# Patient Record
Sex: FEMALE | Race: WHITE | HISPANIC OR LATINO | URBAN - METROPOLITAN AREA
[De-identification: names, ages, dates, MRNs, and addresses within clinical notes are randomized per-mention and may not be internally consistent; named-entity substitution may affect disease eponyms.]

---

## 2018-04-22 ENCOUNTER — INPATIENT (INPATIENT)
Facility: HOSPITAL | Age: 73
LOS: 7 days | Discharge: HOME | DRG: 342 | End: 2018-04-30
Attending: SURGERY | Admitting: SURGERY
Payer: MEDICAID

## 2018-04-22 VITALS — WEIGHT: 134.92 LBS | HEIGHT: 58 IN

## 2018-04-22 DIAGNOSIS — K35.80 UNSPECIFIED ACUTE APPENDICITIS: ICD-10-CM

## 2018-04-22 LAB
ALBUMIN SERPL ELPH-MCNC: 4.5 G/DL — SIGNIFICANT CHANGE UP (ref 3.3–5.2)
ALP SERPL-CCNC: 85 U/L — SIGNIFICANT CHANGE UP (ref 40–120)
ALT FLD-CCNC: 27 U/L — SIGNIFICANT CHANGE UP
ANION GAP SERPL CALC-SCNC: 18 MMOL/L — HIGH (ref 5–17)
APTT BLD: 28.1 SEC — SIGNIFICANT CHANGE UP (ref 27.5–37.4)
AST SERPL-CCNC: 27 U/L — SIGNIFICANT CHANGE UP
BASOPHILS # BLD AUTO: 0 K/UL — SIGNIFICANT CHANGE UP (ref 0–0.2)
BASOPHILS NFR BLD AUTO: 0.1 % — SIGNIFICANT CHANGE UP (ref 0–2)
BILIRUB SERPL-MCNC: 1.1 MG/DL — SIGNIFICANT CHANGE UP (ref 0.4–2)
BLD GP AB SCN SERPL QL: SIGNIFICANT CHANGE UP
BUN SERPL-MCNC: 30 MG/DL — HIGH (ref 8–20)
CALCIUM SERPL-MCNC: 9.6 MG/DL — SIGNIFICANT CHANGE UP (ref 8.6–10.2)
CHLORIDE SERPL-SCNC: 92 MMOL/L — LOW (ref 98–107)
CO2 SERPL-SCNC: 23 MMOL/L — SIGNIFICANT CHANGE UP (ref 22–29)
CREAT SERPL-MCNC: 1.06 MG/DL — SIGNIFICANT CHANGE UP (ref 0.5–1.3)
EOSINOPHIL # BLD AUTO: 0 K/UL — SIGNIFICANT CHANGE UP (ref 0–0.5)
EOSINOPHIL NFR BLD AUTO: 0 % — SIGNIFICANT CHANGE UP (ref 0–6)
GLUCOSE SERPL-MCNC: 115 MG/DL — SIGNIFICANT CHANGE UP (ref 70–115)
HCT VFR BLD CALC: 38.6 % — SIGNIFICANT CHANGE UP (ref 37–47)
HGB BLD-MCNC: 12.9 G/DL — SIGNIFICANT CHANGE UP (ref 12–16)
INR BLD: 1.15 RATIO — SIGNIFICANT CHANGE UP (ref 0.88–1.16)
LACTATE BLDV-MCNC: 4.4 MMOL/L — CRITICAL HIGH (ref 0.5–2)
LIDOCAIN IGE QN: 9 U/L — LOW (ref 22–51)
LYMPHOCYTES # BLD AUTO: 1.1 K/UL — SIGNIFICANT CHANGE UP (ref 1–4.8)
LYMPHOCYTES # BLD AUTO: 7.6 % — LOW (ref 20–55)
MCHC RBC-ENTMCNC: 29.3 PG — SIGNIFICANT CHANGE UP (ref 27–31)
MCHC RBC-ENTMCNC: 33.4 G/DL — SIGNIFICANT CHANGE UP (ref 32–36)
MCV RBC AUTO: 87.5 FL — SIGNIFICANT CHANGE UP (ref 81–99)
MONOCYTES # BLD AUTO: 0.5 K/UL — SIGNIFICANT CHANGE UP (ref 0–0.8)
MONOCYTES NFR BLD AUTO: 3.7 % — SIGNIFICANT CHANGE UP (ref 3–10)
NEUTROPHILS # BLD AUTO: 12.3 K/UL — HIGH (ref 1.8–8)
NEUTROPHILS NFR BLD AUTO: 88.1 % — HIGH (ref 37–73)
PLATELET # BLD AUTO: 233 K/UL — SIGNIFICANT CHANGE UP (ref 150–400)
POTASSIUM SERPL-MCNC: 3.7 MMOL/L — SIGNIFICANT CHANGE UP (ref 3.5–5.3)
POTASSIUM SERPL-SCNC: 3.7 MMOL/L — SIGNIFICANT CHANGE UP (ref 3.5–5.3)
PROT SERPL-MCNC: 8.7 G/DL — SIGNIFICANT CHANGE UP (ref 6.6–8.7)
PROTHROM AB SERPL-ACNC: 12.7 SEC — SIGNIFICANT CHANGE UP (ref 9.8–12.7)
RBC # BLD: 4.41 M/UL — SIGNIFICANT CHANGE UP (ref 4.4–5.2)
RBC # FLD: 13 % — SIGNIFICANT CHANGE UP (ref 11–15.6)
SODIUM SERPL-SCNC: 133 MMOL/L — LOW (ref 135–145)
TYPE + AB SCN PNL BLD: SIGNIFICANT CHANGE UP
WBC # BLD: 14 K/UL — HIGH (ref 4.8–10.8)
WBC # FLD AUTO: 14 K/UL — HIGH (ref 4.8–10.8)

## 2018-04-22 PROCEDURE — 99285 EMERGENCY DEPT VISIT HI MDM: CPT

## 2018-04-22 PROCEDURE — 74177 CT ABD & PELVIS W/CONTRAST: CPT | Mod: 26

## 2018-04-22 PROCEDURE — 88304 TISSUE EXAM BY PATHOLOGIST: CPT | Mod: 26

## 2018-04-22 PROCEDURE — 93010 ELECTROCARDIOGRAM REPORT: CPT

## 2018-04-22 PROCEDURE — 88305 TISSUE EXAM BY PATHOLOGIST: CPT | Mod: 26

## 2018-04-22 PROCEDURE — 71045 X-RAY EXAM CHEST 1 VIEW: CPT | Mod: 26

## 2018-04-22 RX ORDER — PIPERACILLIN AND TAZOBACTAM 4; .5 G/20ML; G/20ML
3.38 INJECTION, POWDER, LYOPHILIZED, FOR SOLUTION INTRAVENOUS ONCE
Qty: 0 | Refills: 0 | Status: COMPLETED | OUTPATIENT
Start: 2018-04-22 | End: 2018-04-22

## 2018-04-22 RX ORDER — ACETAMINOPHEN 500 MG
650 TABLET ORAL EVERY 6 HOURS
Qty: 0 | Refills: 0 | Status: DISCONTINUED | OUTPATIENT
Start: 2018-04-22 | End: 2018-04-30

## 2018-04-22 RX ORDER — HYDROMORPHONE HYDROCHLORIDE 2 MG/ML
0.5 INJECTION INTRAMUSCULAR; INTRAVENOUS; SUBCUTANEOUS
Qty: 0 | Refills: 0 | Status: DISCONTINUED | OUTPATIENT
Start: 2018-04-22 | End: 2018-04-22

## 2018-04-22 RX ORDER — SODIUM CHLORIDE 9 MG/ML
2000 INJECTION INTRAMUSCULAR; INTRAVENOUS; SUBCUTANEOUS ONCE
Qty: 0 | Refills: 0 | Status: COMPLETED | OUTPATIENT
Start: 2018-04-22 | End: 2018-04-22

## 2018-04-22 RX ORDER — SODIUM CHLORIDE 9 MG/ML
1000 INJECTION, SOLUTION INTRAVENOUS
Qty: 0 | Refills: 0 | Status: DISCONTINUED | OUTPATIENT
Start: 2018-04-22 | End: 2018-04-22

## 2018-04-22 RX ORDER — GABAPENTIN 400 MG/1
300 CAPSULE ORAL THREE TIMES A DAY
Qty: 0 | Refills: 0 | Status: DISCONTINUED | OUTPATIENT
Start: 2018-04-22 | End: 2018-04-30

## 2018-04-22 RX ORDER — ENOXAPARIN SODIUM 100 MG/ML
40 INJECTION SUBCUTANEOUS DAILY
Qty: 0 | Refills: 0 | Status: DISCONTINUED | OUTPATIENT
Start: 2018-04-22 | End: 2018-04-30

## 2018-04-22 RX ORDER — FENTANYL CITRATE 50 UG/ML
50 INJECTION INTRAVENOUS
Qty: 0 | Refills: 0 | Status: DISCONTINUED | OUTPATIENT
Start: 2018-04-22 | End: 2018-04-22

## 2018-04-22 RX ORDER — SENNA PLUS 8.6 MG/1
2 TABLET ORAL AT BEDTIME
Qty: 0 | Refills: 0 | Status: DISCONTINUED | OUTPATIENT
Start: 2018-04-22 | End: 2018-04-30

## 2018-04-22 RX ORDER — PIPERACILLIN AND TAZOBACTAM 4; .5 G/20ML; G/20ML
3.38 INJECTION, POWDER, LYOPHILIZED, FOR SOLUTION INTRAVENOUS EVERY 8 HOURS
Qty: 0 | Refills: 0 | Status: DISCONTINUED | OUTPATIENT
Start: 2018-04-22 | End: 2018-04-28

## 2018-04-22 RX ORDER — ONDANSETRON 8 MG/1
4 TABLET, FILM COATED ORAL EVERY 6 HOURS
Qty: 0 | Refills: 0 | Status: DISCONTINUED | OUTPATIENT
Start: 2018-04-22 | End: 2018-04-30

## 2018-04-22 RX ORDER — ACETAMINOPHEN 500 MG
650 TABLET ORAL ONCE
Qty: 0 | Refills: 0 | Status: COMPLETED | OUTPATIENT
Start: 2018-04-22 | End: 2018-04-22

## 2018-04-22 RX ORDER — TRAMADOL HYDROCHLORIDE 50 MG/1
25 TABLET ORAL EVERY 4 HOURS
Qty: 0 | Refills: 0 | Status: DISCONTINUED | OUTPATIENT
Start: 2018-04-22 | End: 2018-04-27

## 2018-04-22 RX ORDER — DOCUSATE SODIUM 100 MG
100 CAPSULE ORAL THREE TIMES A DAY
Qty: 0 | Refills: 0 | Status: DISCONTINUED | OUTPATIENT
Start: 2018-04-22 | End: 2018-04-30

## 2018-04-22 RX ORDER — ONDANSETRON 8 MG/1
4 TABLET, FILM COATED ORAL ONCE
Qty: 0 | Refills: 0 | Status: DISCONTINUED | OUTPATIENT
Start: 2018-04-22 | End: 2018-04-22

## 2018-04-22 RX ORDER — HYDROMORPHONE HYDROCHLORIDE 2 MG/ML
0.5 INJECTION INTRAMUSCULAR; INTRAVENOUS; SUBCUTANEOUS ONCE
Qty: 0 | Refills: 0 | Status: DISCONTINUED | OUTPATIENT
Start: 2018-04-22 | End: 2018-04-22

## 2018-04-22 RX ORDER — IBUPROFEN 200 MG
400 TABLET ORAL EVERY 6 HOURS
Qty: 0 | Refills: 0 | Status: DISCONTINUED | OUTPATIENT
Start: 2018-04-22 | End: 2018-04-30

## 2018-04-22 RX ADMIN — SODIUM CHLORIDE 2000 MILLILITER(S): 9 INJECTION INTRAMUSCULAR; INTRAVENOUS; SUBCUTANEOUS at 10:19

## 2018-04-22 RX ADMIN — Medication 400 MILLIGRAM(S): at 17:31

## 2018-04-22 RX ADMIN — Medication 650 MILLIGRAM(S): at 09:45

## 2018-04-22 RX ADMIN — HYDROMORPHONE HYDROCHLORIDE 0.5 MILLIGRAM(S): 2 INJECTION INTRAMUSCULAR; INTRAVENOUS; SUBCUTANEOUS at 10:19

## 2018-04-22 RX ADMIN — PIPERACILLIN AND TAZOBACTAM 200 GRAM(S): 4; .5 INJECTION, POWDER, LYOPHILIZED, FOR SOLUTION INTRAVENOUS at 09:36

## 2018-04-22 RX ADMIN — PIPERACILLIN AND TAZOBACTAM 25 GRAM(S): 4; .5 INJECTION, POWDER, LYOPHILIZED, FOR SOLUTION INTRAVENOUS at 17:31

## 2018-04-22 NOTE — ED ADULT NURSE NOTE - CAS EDN DISCHARGE ASSESSMENT
Symptoms improved/Alert and oriented to person, place and time/Patient baseline mental status/No adverse reaction to first time med in ED/Awake

## 2018-04-22 NOTE — BRIEF OPERATIVE NOTE - PROCEDURE
<<-----Click on this checkbox to enter Procedure Laparoscopic appendectomy  04/22/2018  with lysis of adhesions, purulent  Active  [id number]

## 2018-04-22 NOTE — ED ADULT TRIAGE NOTE - CHIEF COMPLAINT QUOTE
Patient is awake and oriented times 3 HArd of Hearing, arrives ambulatory from home, patient complains "I have bad abdominal pain since yesterday", patient denies any recent travel or ill contacts

## 2018-04-22 NOTE — H&P ADULT - NSHPPHYSICALEXAM_GEN_ALL_CORE
Vital Signs Last 24 Hrs  T(C): 38 (22 Apr 2018 10:51), Max: 38 (22 Apr 2018 10:51)  T(F): 100.4 (22 Apr 2018 10:51), Max: 100.4 (22 Apr 2018 10:51)  HR: 103 (22 Apr 2018 10:35) (103 - 140)  BP: 106/64 (22 Apr 2018 10:35) (106/64 - 112/64)  BP(mean): --  RR: 20 (22 Apr 2018 10:35) (20 - 20)  SpO2: 94% (22 Apr 2018 10:35) (94% - 94%)    PE  Gen: NAD, AAOx3  Pulm: CTAB  CV: RRR  Abd: soft, epigastric tenderness, no rebound, nondistended  Ext: Moving all extremities  Vasc: 2+ peripheral pulses  Neuro: GCS 15, nonfocal

## 2018-04-22 NOTE — H&P ADULT - PROBLEM SELECTOR PLAN 1
-admit ACS under Dr. Myers  -IV abx zosyn  -NPO  -LR @ 100  -pain management  -dvt ppx lovenox  -plan for laparoscopic possible open appendectomy today  -pt seen and examined with Dr. Myers

## 2018-04-22 NOTE — H&P ADULT - NSHPLABSRESULTS_GEN_ALL_CORE
LABS:                        12.9   14.0  )-----------( 233      ( 22 Apr 2018 09:37 )             38.6     04-22    133<L>  |  92<L>  |  30.0<H>  ----------------------------<  115  3.7   |  23.0  |  1.06    Ca    9.6      22 Apr 2018 09:37    TPro  8.7  /  Alb  4.5  /  TBili  1.1  /  DBili  x   /  AST  27  /  ALT  27  /  AlkPhos  85  04-22    PT/INR - ( 22 Apr 2018 09:37 )   PT: 12.7 sec;   INR: 1.15 ratio         PTT - ( 22 Apr 2018 09:37 )  PTT:28.1 sec      RADIOLOGY & ADDITIONAL STUDIES:  CT abd/pelv: inflamed midline appendix, nonperforated

## 2018-04-22 NOTE — H&P ADULT - NSHPREVIEWOFSYSTEMS_GEN_ALL_CORE
ROS:  CONSTITUTIONAL: denies fevers, chills  HEENT: Denies headache, blurry vision  RESPIRATORY: Denies cough  CARDIAC: Denies chest pain, racing heart  GASTROINTESTINAL: Denies constipation, diarrhea  GENITOURINARY: Denies dysuria, hematuria  NEUROLOGIC: Denies headaches, dizziness  MUSCULOSKELETAL: Denies muscle weakness  VASCULAR: Denies claudication and cramping.  ENDOCRINOLOGY: Denies heat or cold intolerance

## 2018-04-22 NOTE — H&P ADULT - HISTORY OF PRESENT ILLNESS
Patient is a 72 year old female who presents to the ED with 2 day history of abdominal pain, 8/10 intensity, sharp, epigastric region, does not radiate, no alleviating factors, worse with movement, reports tactile fever at home, denies nausea or emesis, denies diarrhea or constipation, no sick contacts, no recent travel. Pt has no other complaint at this time.

## 2018-04-22 NOTE — ED PROVIDER NOTE - OBJECTIVE STATEMENT
73 y/o F pt with a hx of hard of hearing, presents to ED c/o severe right sided abd pain x2 days and vomiting ( since yesterday), Pt has a small ventral hernia.  Denies diarrhea, SOB, and CP. No further complaints at this time.

## 2018-04-23 LAB
ANION GAP SERPL CALC-SCNC: 15 MMOL/L — SIGNIFICANT CHANGE UP (ref 5–17)
APPEARANCE UR: CLEAR — SIGNIFICANT CHANGE UP
BASOPHILS # BLD AUTO: 0 K/UL — SIGNIFICANT CHANGE UP (ref 0–0.2)
BASOPHILS NFR BLD AUTO: 0.1 % — SIGNIFICANT CHANGE UP (ref 0–2)
BILIRUB UR-MCNC: NEGATIVE — SIGNIFICANT CHANGE UP
BUN SERPL-MCNC: 23 MG/DL — HIGH (ref 8–20)
CALCIUM SERPL-MCNC: 8.6 MG/DL — SIGNIFICANT CHANGE UP (ref 8.6–10.2)
CHLORIDE SERPL-SCNC: 102 MMOL/L — SIGNIFICANT CHANGE UP (ref 98–107)
CO2 SERPL-SCNC: 21 MMOL/L — LOW (ref 22–29)
COLOR SPEC: YELLOW — SIGNIFICANT CHANGE UP
CREAT SERPL-MCNC: 0.84 MG/DL — SIGNIFICANT CHANGE UP (ref 0.5–1.3)
DIFF PNL FLD: ABNORMAL
EOSINOPHIL # BLD AUTO: 0 K/UL — SIGNIFICANT CHANGE UP (ref 0–0.5)
EOSINOPHIL NFR BLD AUTO: 0 % — SIGNIFICANT CHANGE UP (ref 0–6)
EPI CELLS # UR: SIGNIFICANT CHANGE UP
GLUCOSE SERPL-MCNC: 111 MG/DL — SIGNIFICANT CHANGE UP (ref 70–115)
GLUCOSE UR QL: NEGATIVE MG/DL — SIGNIFICANT CHANGE UP
HCT VFR BLD CALC: 30.6 % — LOW (ref 37–47)
HGB BLD-MCNC: 10 G/DL — LOW (ref 12–16)
KETONES UR-MCNC: NEGATIVE — SIGNIFICANT CHANGE UP
LACTATE BLDV-MCNC: 2.9 MMOL/L — HIGH (ref 0.5–2)
LEUKOCYTE ESTERASE UR-ACNC: NEGATIVE — SIGNIFICANT CHANGE UP
LYMPHOCYTES # BLD AUTO: 0.7 K/UL — LOW (ref 1–4.8)
LYMPHOCYTES # BLD AUTO: 8.3 % — LOW (ref 20–55)
MAGNESIUM SERPL-MCNC: 2.3 MG/DL — SIGNIFICANT CHANGE UP (ref 1.6–2.6)
MCHC RBC-ENTMCNC: 28.5 PG — SIGNIFICANT CHANGE UP (ref 27–31)
MCHC RBC-ENTMCNC: 32.7 G/DL — SIGNIFICANT CHANGE UP (ref 32–36)
MCV RBC AUTO: 87.2 FL — SIGNIFICANT CHANGE UP (ref 81–99)
MONOCYTES # BLD AUTO: 0.4 K/UL — SIGNIFICANT CHANGE UP (ref 0–0.8)
MONOCYTES NFR BLD AUTO: 4.3 % — SIGNIFICANT CHANGE UP (ref 3–10)
NEUTROPHILS # BLD AUTO: 7.3 K/UL — SIGNIFICANT CHANGE UP (ref 1.8–8)
NEUTROPHILS NFR BLD AUTO: 87.1 % — HIGH (ref 37–73)
NITRITE UR-MCNC: NEGATIVE — SIGNIFICANT CHANGE UP
PH UR: 5 — SIGNIFICANT CHANGE UP (ref 5–8)
PHOSPHATE SERPL-MCNC: 3.9 MG/DL — SIGNIFICANT CHANGE UP (ref 2.4–4.7)
PLATELET # BLD AUTO: 153 K/UL — SIGNIFICANT CHANGE UP (ref 150–400)
POTASSIUM SERPL-MCNC: 3.7 MMOL/L — SIGNIFICANT CHANGE UP (ref 3.5–5.3)
POTASSIUM SERPL-SCNC: 3.7 MMOL/L — SIGNIFICANT CHANGE UP (ref 3.5–5.3)
PROT UR-MCNC: NEGATIVE MG/DL — SIGNIFICANT CHANGE UP
RBC # BLD: 3.51 M/UL — LOW (ref 4.4–5.2)
RBC # FLD: 13.4 % — SIGNIFICANT CHANGE UP (ref 11–15.6)
RBC CASTS # UR COMP ASSIST: ABNORMAL /HPF (ref 0–4)
SODIUM SERPL-SCNC: 138 MMOL/L — SIGNIFICANT CHANGE UP (ref 135–145)
SP GR SPEC: 1 — LOW (ref 1.01–1.02)
UROBILINOGEN FLD QL: NEGATIVE MG/DL — SIGNIFICANT CHANGE UP
WBC # BLD: 8.4 K/UL — SIGNIFICANT CHANGE UP (ref 4.8–10.8)
WBC # FLD AUTO: 8.4 K/UL — SIGNIFICANT CHANGE UP (ref 4.8–10.8)
WBC UR QL: SIGNIFICANT CHANGE UP

## 2018-04-23 RX ORDER — SODIUM CHLORIDE 9 MG/ML
1000 INJECTION, SOLUTION INTRAVENOUS
Qty: 0 | Refills: 0 | Status: DISCONTINUED | OUTPATIENT
Start: 2018-04-23 | End: 2018-04-24

## 2018-04-23 RX ADMIN — Medication 400 MILLIGRAM(S): at 12:58

## 2018-04-23 RX ADMIN — Medication 650 MILLIGRAM(S): at 07:39

## 2018-04-23 RX ADMIN — TRAMADOL HYDROCHLORIDE 25 MILLIGRAM(S): 50 TABLET ORAL at 11:01

## 2018-04-23 RX ADMIN — TRAMADOL HYDROCHLORIDE 25 MILLIGRAM(S): 50 TABLET ORAL at 22:35

## 2018-04-23 RX ADMIN — Medication 400 MILLIGRAM(S): at 20:05

## 2018-04-23 RX ADMIN — Medication 400 MILLIGRAM(S): at 11:32

## 2018-04-23 RX ADMIN — SODIUM CHLORIDE 75 MILLILITER(S): 9 INJECTION, SOLUTION INTRAVENOUS at 19:05

## 2018-04-23 RX ADMIN — GABAPENTIN 300 MILLIGRAM(S): 400 CAPSULE ORAL at 01:36

## 2018-04-23 RX ADMIN — Medication 400 MILLIGRAM(S): at 06:41

## 2018-04-23 RX ADMIN — PIPERACILLIN AND TAZOBACTAM 25 GRAM(S): 4; .5 INJECTION, POWDER, LYOPHILIZED, FOR SOLUTION INTRAVENOUS at 10:07

## 2018-04-23 RX ADMIN — Medication 100 MILLIGRAM(S): at 06:41

## 2018-04-23 RX ADMIN — ENOXAPARIN SODIUM 40 MILLIGRAM(S): 100 INJECTION SUBCUTANEOUS at 11:31

## 2018-04-23 RX ADMIN — Medication 400 MILLIGRAM(S): at 00:43

## 2018-04-23 RX ADMIN — Medication 100 MILLIGRAM(S): at 00:13

## 2018-04-23 RX ADMIN — PIPERACILLIN AND TAZOBACTAM 25 GRAM(S): 4; .5 INJECTION, POWDER, LYOPHILIZED, FOR SOLUTION INTRAVENOUS at 19:04

## 2018-04-23 RX ADMIN — Medication 100 MILLIGRAM(S): at 14:27

## 2018-04-23 RX ADMIN — GABAPENTIN 300 MILLIGRAM(S): 400 CAPSULE ORAL at 06:41

## 2018-04-23 RX ADMIN — Medication 400 MILLIGRAM(S): at 19:05

## 2018-04-23 RX ADMIN — Medication 650 MILLIGRAM(S): at 19:06

## 2018-04-23 RX ADMIN — TRAMADOL HYDROCHLORIDE 25 MILLIGRAM(S): 50 TABLET ORAL at 10:14

## 2018-04-23 RX ADMIN — Medication 400 MILLIGRAM(S): at 00:13

## 2018-04-23 RX ADMIN — Medication 400 MILLIGRAM(S): at 07:39

## 2018-04-23 RX ADMIN — Medication 100 MILLIGRAM(S): at 21:43

## 2018-04-23 RX ADMIN — GABAPENTIN 300 MILLIGRAM(S): 400 CAPSULE ORAL at 14:27

## 2018-04-23 RX ADMIN — Medication 650 MILLIGRAM(S): at 13:10

## 2018-04-23 RX ADMIN — Medication 650 MILLIGRAM(S): at 11:31

## 2018-04-23 RX ADMIN — Medication 650 MILLIGRAM(S): at 20:06

## 2018-04-23 RX ADMIN — TRAMADOL HYDROCHLORIDE 25 MILLIGRAM(S): 50 TABLET ORAL at 21:43

## 2018-04-23 RX ADMIN — Medication 650 MILLIGRAM(S): at 00:43

## 2018-04-23 RX ADMIN — Medication 650 MILLIGRAM(S): at 06:38

## 2018-04-23 RX ADMIN — GABAPENTIN 300 MILLIGRAM(S): 400 CAPSULE ORAL at 21:43

## 2018-04-23 RX ADMIN — Medication 650 MILLIGRAM(S): at 00:13

## 2018-04-23 RX ADMIN — PIPERACILLIN AND TAZOBACTAM 25 GRAM(S): 4; .5 INJECTION, POWDER, LYOPHILIZED, FOR SOLUTION INTRAVENOUS at 01:37

## 2018-04-23 NOTE — PATIENT PROFILE ADULT. - VISION (WITH CORRECTIVE LENSES IF THE PATIENT USUALLY WEARS THEM):
ptx requires glasses, however glasses were not present  during this admission/Severely impaired: cannot locate objects without hearing or touching them or patient nonresponsive.

## 2018-04-23 NOTE — PATIENT PROFILE ADULT. - ABILITY TO HEAR (WITH HEARING AID OR HEARING APPLIANCE IF NORMALLY USED):
ptx is [place] requires hearing aides that are not present at this time of hospitalization/Severely Impaired: absence of useful hearing

## 2018-04-24 LAB
ANION GAP SERPL CALC-SCNC: 12 MMOL/L — SIGNIFICANT CHANGE UP (ref 5–17)
BASOPHILS # BLD AUTO: 0 K/UL — SIGNIFICANT CHANGE UP (ref 0–0.2)
BASOPHILS NFR BLD AUTO: 0.1 % — SIGNIFICANT CHANGE UP (ref 0–2)
BUN SERPL-MCNC: 14 MG/DL — SIGNIFICANT CHANGE UP (ref 8–20)
CALCIUM SERPL-MCNC: 8 MG/DL — LOW (ref 8.6–10.2)
CHLORIDE SERPL-SCNC: 105 MMOL/L — SIGNIFICANT CHANGE UP (ref 98–107)
CO2 SERPL-SCNC: 23 MMOL/L — SIGNIFICANT CHANGE UP (ref 22–29)
CREAT SERPL-MCNC: 0.73 MG/DL — SIGNIFICANT CHANGE UP (ref 0.5–1.3)
EOSINOPHIL # BLD AUTO: 0 K/UL — SIGNIFICANT CHANGE UP (ref 0–0.5)
EOSINOPHIL NFR BLD AUTO: 0.3 % — SIGNIFICANT CHANGE UP (ref 0–6)
GLUCOSE SERPL-MCNC: 86 MG/DL — SIGNIFICANT CHANGE UP (ref 70–115)
HCT VFR BLD CALC: 30.1 % — LOW (ref 37–47)
HGB BLD-MCNC: 9.8 G/DL — LOW (ref 12–16)
LACTATE BLDV-MCNC: 1.6 MMOL/L — SIGNIFICANT CHANGE UP (ref 0.5–2)
LYMPHOCYTES # BLD AUTO: 1.1 K/UL — SIGNIFICANT CHANGE UP (ref 1–4.8)
LYMPHOCYTES # BLD AUTO: 12.8 % — LOW (ref 20–55)
MAGNESIUM SERPL-MCNC: 2.2 MG/DL — SIGNIFICANT CHANGE UP (ref 1.6–2.6)
MCHC RBC-ENTMCNC: 28.5 PG — SIGNIFICANT CHANGE UP (ref 27–31)
MCHC RBC-ENTMCNC: 32.6 G/DL — SIGNIFICANT CHANGE UP (ref 32–36)
MCV RBC AUTO: 87.5 FL — SIGNIFICANT CHANGE UP (ref 81–99)
MONOCYTES # BLD AUTO: 0.5 K/UL — SIGNIFICANT CHANGE UP (ref 0–0.8)
MONOCYTES NFR BLD AUTO: 6 % — SIGNIFICANT CHANGE UP (ref 3–10)
NEUTROPHILS # BLD AUTO: 7 K/UL — SIGNIFICANT CHANGE UP (ref 1.8–8)
NEUTROPHILS NFR BLD AUTO: 80.3 % — HIGH (ref 37–73)
PHOSPHATE SERPL-MCNC: 2.2 MG/DL — LOW (ref 2.4–4.7)
PLATELET # BLD AUTO: 179 K/UL — SIGNIFICANT CHANGE UP (ref 150–400)
POTASSIUM SERPL-MCNC: 3.4 MMOL/L — LOW (ref 3.5–5.3)
POTASSIUM SERPL-SCNC: 3.4 MMOL/L — LOW (ref 3.5–5.3)
RBC # BLD: 3.44 M/UL — LOW (ref 4.4–5.2)
RBC # FLD: 13.4 % — SIGNIFICANT CHANGE UP (ref 11–15.6)
SODIUM SERPL-SCNC: 140 MMOL/L — SIGNIFICANT CHANGE UP (ref 135–145)
WBC # BLD: 8.7 K/UL — SIGNIFICANT CHANGE UP (ref 4.8–10.8)
WBC # FLD AUTO: 8.7 K/UL — SIGNIFICANT CHANGE UP (ref 4.8–10.8)

## 2018-04-24 RX ORDER — POTASSIUM CHLORIDE 20 MEQ
40 PACKET (EA) ORAL ONCE
Qty: 0 | Refills: 0 | Status: COMPLETED | OUTPATIENT
Start: 2018-04-24 | End: 2018-04-24

## 2018-04-24 RX ORDER — SODIUM,POTASSIUM PHOSPHATES 278-250MG
1 POWDER IN PACKET (EA) ORAL
Qty: 0 | Refills: 0 | Status: COMPLETED | OUTPATIENT
Start: 2018-04-24 | End: 2018-04-24

## 2018-04-24 RX ORDER — IPRATROPIUM/ALBUTEROL SULFATE 18-103MCG
3 AEROSOL WITH ADAPTER (GRAM) INHALATION EVERY 6 HOURS
Qty: 0 | Refills: 0 | Status: DISCONTINUED | OUTPATIENT
Start: 2018-04-24 | End: 2018-04-30

## 2018-04-24 RX ADMIN — Medication 400 MILLIGRAM(S): at 01:24

## 2018-04-24 RX ADMIN — Medication 400 MILLIGRAM(S): at 00:24

## 2018-04-24 RX ADMIN — Medication 40 MILLIEQUIVALENT(S): at 14:44

## 2018-04-24 RX ADMIN — Medication 3 MILLILITER(S): at 18:59

## 2018-04-24 RX ADMIN — PIPERACILLIN AND TAZOBACTAM 25 GRAM(S): 4; .5 INJECTION, POWDER, LYOPHILIZED, FOR SOLUTION INTRAVENOUS at 00:24

## 2018-04-24 RX ADMIN — TRAMADOL HYDROCHLORIDE 25 MILLIGRAM(S): 50 TABLET ORAL at 21:54

## 2018-04-24 RX ADMIN — Medication 400 MILLIGRAM(S): at 07:17

## 2018-04-24 RX ADMIN — Medication 650 MILLIGRAM(S): at 00:23

## 2018-04-24 RX ADMIN — PIPERACILLIN AND TAZOBACTAM 25 GRAM(S): 4; .5 INJECTION, POWDER, LYOPHILIZED, FOR SOLUTION INTRAVENOUS at 18:45

## 2018-04-24 RX ADMIN — Medication 650 MILLIGRAM(S): at 23:15

## 2018-04-24 RX ADMIN — Medication 650 MILLIGRAM(S): at 01:23

## 2018-04-24 RX ADMIN — Medication 650 MILLIGRAM(S): at 12:21

## 2018-04-24 RX ADMIN — Medication 400 MILLIGRAM(S): at 12:20

## 2018-04-24 RX ADMIN — ENOXAPARIN SODIUM 40 MILLIGRAM(S): 100 INJECTION SUBCUTANEOUS at 12:21

## 2018-04-24 RX ADMIN — Medication 100 MILLIGRAM(S): at 20:53

## 2018-04-24 RX ADMIN — Medication 650 MILLIGRAM(S): at 18:00

## 2018-04-24 RX ADMIN — Medication 400 MILLIGRAM(S): at 18:00

## 2018-04-24 RX ADMIN — Medication 400 MILLIGRAM(S): at 23:15

## 2018-04-24 RX ADMIN — Medication 3 MILLILITER(S): at 04:02

## 2018-04-24 RX ADMIN — Medication 650 MILLIGRAM(S): at 06:09

## 2018-04-24 RX ADMIN — GABAPENTIN 300 MILLIGRAM(S): 400 CAPSULE ORAL at 20:53

## 2018-04-24 RX ADMIN — Medication 650 MILLIGRAM(S): at 06:55

## 2018-04-24 RX ADMIN — GABAPENTIN 300 MILLIGRAM(S): 400 CAPSULE ORAL at 13:31

## 2018-04-24 RX ADMIN — Medication 650 MILLIGRAM(S): at 13:15

## 2018-04-24 RX ADMIN — Medication 400 MILLIGRAM(S): at 06:17

## 2018-04-24 RX ADMIN — Medication 650 MILLIGRAM(S): at 17:13

## 2018-04-24 RX ADMIN — Medication 400 MILLIGRAM(S): at 13:15

## 2018-04-24 RX ADMIN — Medication 100 MILLIGRAM(S): at 06:09

## 2018-04-24 RX ADMIN — Medication 400 MILLIGRAM(S): at 17:11

## 2018-04-24 RX ADMIN — PIPERACILLIN AND TAZOBACTAM 25 GRAM(S): 4; .5 INJECTION, POWDER, LYOPHILIZED, FOR SOLUTION INTRAVENOUS at 11:15

## 2018-04-24 RX ADMIN — GABAPENTIN 300 MILLIGRAM(S): 400 CAPSULE ORAL at 06:09

## 2018-04-24 RX ADMIN — Medication 1 TABLET(S): at 17:11

## 2018-04-24 RX ADMIN — TRAMADOL HYDROCHLORIDE 25 MILLIGRAM(S): 50 TABLET ORAL at 20:54

## 2018-04-25 LAB
-  CANDIDA ALBICANS: SIGNIFICANT CHANGE UP
-  CANDIDA GLABRATA: SIGNIFICANT CHANGE UP
-  CANDIDA KRUSEI: SIGNIFICANT CHANGE UP
-  CANDIDA PARAPSILOSIS: SIGNIFICANT CHANGE UP
-  CANDIDA TROPICALIS: SIGNIFICANT CHANGE UP
-  COAGULASE NEGATIVE STAPHYLOCOCCUS: SIGNIFICANT CHANGE UP
-  K. PNEUMONIAE GROUP: SIGNIFICANT CHANGE UP
-  KPC RESISTANCE GENE: SIGNIFICANT CHANGE UP
-  STREPTOCOCCUS SP. (NOT GRP A, B OR S PNEUMONIAE): SIGNIFICANT CHANGE UP
A BAUMANNII DNA SPEC QL NAA+PROBE: SIGNIFICANT CHANGE UP
E CLOAC COMP DNA BLD POS QL NAA+PROBE: SIGNIFICANT CHANGE UP
E COLI DNA BLD POS QL NAA+NON-PROBE: SIGNIFICANT CHANGE UP
ENTEROCOC DNA BLD POS QL NAA+NON-PROBE: SIGNIFICANT CHANGE UP
ENTEROCOC DNA BLD POS QL NAA+NON-PROBE: SIGNIFICANT CHANGE UP
GP B STREP DNA BLD POS QL NAA+NON-PROBE: SIGNIFICANT CHANGE UP
HAEM INFLU DNA BLD POS QL NAA+NON-PROBE: SIGNIFICANT CHANGE UP
K OXYTOCA DNA BLD POS QL NAA+NON-PROBE: SIGNIFICANT CHANGE UP
L MONOCYTOG DNA BLD POS QL NAA+NON-PROBE: SIGNIFICANT CHANGE UP
METHOD TYPE: SIGNIFICANT CHANGE UP
MRSA SPEC QL CULT: SIGNIFICANT CHANGE UP
MSSA DNA SPEC QL NAA+PROBE: SIGNIFICANT CHANGE UP
N MEN ISLT CULT: SIGNIFICANT CHANGE UP
P AERUGINOSA DNA BLD POS NAA+NON-PROBE: SIGNIFICANT CHANGE UP
PROTEUS SP DNA BLD POS QL NAA+NON-PROBE: SIGNIFICANT CHANGE UP
S MARCESCENS DNA BLD POS NAA+NON-PROBE: SIGNIFICANT CHANGE UP
S PNEUM DNA BLD POS QL NAA+NON-PROBE: SIGNIFICANT CHANGE UP
S PYO DNA BLD POS QL NAA+NON-PROBE: SIGNIFICANT CHANGE UP

## 2018-04-25 RX ADMIN — TRAMADOL HYDROCHLORIDE 25 MILLIGRAM(S): 50 TABLET ORAL at 14:13

## 2018-04-25 RX ADMIN — Medication 400 MILLIGRAM(S): at 00:15

## 2018-04-25 RX ADMIN — Medication 650 MILLIGRAM(S): at 00:15

## 2018-04-25 RX ADMIN — Medication 400 MILLIGRAM(S): at 18:08

## 2018-04-25 RX ADMIN — Medication 100 MILLIGRAM(S): at 05:33

## 2018-04-25 RX ADMIN — Medication 400 MILLIGRAM(S): at 08:07

## 2018-04-25 RX ADMIN — PIPERACILLIN AND TAZOBACTAM 25 GRAM(S): 4; .5 INJECTION, POWDER, LYOPHILIZED, FOR SOLUTION INTRAVENOUS at 18:11

## 2018-04-25 RX ADMIN — GABAPENTIN 300 MILLIGRAM(S): 400 CAPSULE ORAL at 21:10

## 2018-04-25 RX ADMIN — Medication 400 MILLIGRAM(S): at 18:05

## 2018-04-25 RX ADMIN — Medication 650 MILLIGRAM(S): at 08:07

## 2018-04-25 RX ADMIN — Medication 100 MILLIGRAM(S): at 21:10

## 2018-04-25 RX ADMIN — Medication 400 MILLIGRAM(S): at 05:32

## 2018-04-25 RX ADMIN — TRAMADOL HYDROCHLORIDE 25 MILLIGRAM(S): 50 TABLET ORAL at 13:17

## 2018-04-25 RX ADMIN — Medication 400 MILLIGRAM(S): at 18:30

## 2018-04-25 RX ADMIN — PIPERACILLIN AND TAZOBACTAM 25 GRAM(S): 4; .5 INJECTION, POWDER, LYOPHILIZED, FOR SOLUTION INTRAVENOUS at 08:41

## 2018-04-25 RX ADMIN — ENOXAPARIN SODIUM 40 MILLIGRAM(S): 100 INJECTION SUBCUTANEOUS at 12:45

## 2018-04-25 RX ADMIN — Medication 400 MILLIGRAM(S): at 14:20

## 2018-04-25 RX ADMIN — Medication 650 MILLIGRAM(S): at 18:30

## 2018-04-25 RX ADMIN — HYDROMORPHONE HYDROCHLORIDE 0.5 MILLIGRAM(S): 2 INJECTION INTRAMUSCULAR; INTRAVENOUS; SUBCUTANEOUS at 08:36

## 2018-04-25 RX ADMIN — Medication 650 MILLIGRAM(S): at 18:05

## 2018-04-25 RX ADMIN — Medication 650 MILLIGRAM(S): at 14:20

## 2018-04-25 RX ADMIN — Medication 650 MILLIGRAM(S): at 05:32

## 2018-04-25 RX ADMIN — GABAPENTIN 300 MILLIGRAM(S): 400 CAPSULE ORAL at 12:52

## 2018-04-25 RX ADMIN — PIPERACILLIN AND TAZOBACTAM 25 GRAM(S): 4; .5 INJECTION, POWDER, LYOPHILIZED, FOR SOLUTION INTRAVENOUS at 00:30

## 2018-04-25 RX ADMIN — GABAPENTIN 300 MILLIGRAM(S): 400 CAPSULE ORAL at 05:33

## 2018-04-25 RX ADMIN — Medication 650 MILLIGRAM(S): at 18:08

## 2018-04-26 LAB
ANISOCYTOSIS BLD QL: SLIGHT — SIGNIFICANT CHANGE UP
C DIFF BY PCR RESULT: SIGNIFICANT CHANGE UP
C DIFF TOX GENS STL QL NAA+PROBE: SIGNIFICANT CHANGE UP
HCT VFR BLD CALC: 34.2 % — LOW (ref 37–47)
HGB BLD-MCNC: 11 G/DL — LOW (ref 12–16)
HYPOCHROMIA BLD QL: SLIGHT — SIGNIFICANT CHANGE UP
LYMPHOCYTES # BLD AUTO: 13 % — LOW (ref 20–55)
MACROCYTES BLD QL: SLIGHT — SIGNIFICANT CHANGE UP
MCHC RBC-ENTMCNC: 28.4 PG — SIGNIFICANT CHANGE UP (ref 27–31)
MCHC RBC-ENTMCNC: 32.2 G/DL — SIGNIFICANT CHANGE UP (ref 32–36)
MCV RBC AUTO: 88.4 FL — SIGNIFICANT CHANGE UP (ref 81–99)
MICROCYTES BLD QL: SLIGHT — SIGNIFICANT CHANGE UP
MONOCYTES NFR BLD AUTO: 8 % — SIGNIFICANT CHANGE UP (ref 3–10)
NEUTROPHILS NFR BLD AUTO: 79 % — HIGH (ref 37–73)
PLAT MORPH BLD: NORMAL — SIGNIFICANT CHANGE UP
PLATELET # BLD AUTO: 226 K/UL — SIGNIFICANT CHANGE UP (ref 150–400)
POIKILOCYTOSIS BLD QL AUTO: SLIGHT — SIGNIFICANT CHANGE UP
RBC # BLD: 3.87 M/UL — LOW (ref 4.4–5.2)
RBC # FLD: 13.6 % — SIGNIFICANT CHANGE UP (ref 11–15.6)
RBC BLD AUTO: ABNORMAL
WBC # BLD: 6.1 K/UL — SIGNIFICANT CHANGE UP (ref 4.8–10.8)
WBC # FLD AUTO: 6.1 K/UL — SIGNIFICANT CHANGE UP (ref 4.8–10.8)

## 2018-04-26 RX ORDER — POTASSIUM PHOSPHATE, MONOBASIC POTASSIUM PHOSPHATE, DIBASIC 236; 224 MG/ML; MG/ML
15 INJECTION, SOLUTION INTRAVENOUS ONCE
Qty: 0 | Refills: 0 | Status: COMPLETED | OUTPATIENT
Start: 2018-04-26 | End: 2018-04-26

## 2018-04-26 RX ORDER — POTASSIUM CHLORIDE 20 MEQ
20 PACKET (EA) ORAL
Qty: 0 | Refills: 0 | Status: COMPLETED | OUTPATIENT
Start: 2018-04-26 | End: 2018-04-26

## 2018-04-26 RX ADMIN — TRAMADOL HYDROCHLORIDE 25 MILLIGRAM(S): 50 TABLET ORAL at 19:37

## 2018-04-26 RX ADMIN — Medication 650 MILLIGRAM(S): at 12:42

## 2018-04-26 RX ADMIN — Medication 100 MILLIGRAM(S): at 17:11

## 2018-04-26 RX ADMIN — PIPERACILLIN AND TAZOBACTAM 25 GRAM(S): 4; .5 INJECTION, POWDER, LYOPHILIZED, FOR SOLUTION INTRAVENOUS at 17:13

## 2018-04-26 RX ADMIN — Medication 650 MILLIGRAM(S): at 23:19

## 2018-04-26 RX ADMIN — Medication 650 MILLIGRAM(S): at 00:50

## 2018-04-26 RX ADMIN — PIPERACILLIN AND TAZOBACTAM 25 GRAM(S): 4; .5 INJECTION, POWDER, LYOPHILIZED, FOR SOLUTION INTRAVENOUS at 10:02

## 2018-04-26 RX ADMIN — Medication 400 MILLIGRAM(S): at 12:42

## 2018-04-26 RX ADMIN — GABAPENTIN 300 MILLIGRAM(S): 400 CAPSULE ORAL at 17:11

## 2018-04-26 RX ADMIN — Medication 400 MILLIGRAM(S): at 17:13

## 2018-04-26 RX ADMIN — Medication 400 MILLIGRAM(S): at 00:20

## 2018-04-26 RX ADMIN — Medication 400 MILLIGRAM(S): at 06:29

## 2018-04-26 RX ADMIN — PIPERACILLIN AND TAZOBACTAM 25 GRAM(S): 4; .5 INJECTION, POWDER, LYOPHILIZED, FOR SOLUTION INTRAVENOUS at 00:20

## 2018-04-26 RX ADMIN — POTASSIUM PHOSPHATE, MONOBASIC POTASSIUM PHOSPHATE, DIBASIC 62.5 MILLIMOLE(S): 236; 224 INJECTION, SOLUTION INTRAVENOUS at 15:12

## 2018-04-26 RX ADMIN — ENOXAPARIN SODIUM 40 MILLIGRAM(S): 100 INJECTION SUBCUTANEOUS at 12:42

## 2018-04-26 RX ADMIN — Medication 20 MILLIEQUIVALENT(S): at 11:22

## 2018-04-26 RX ADMIN — Medication 100 MILLIGRAM(S): at 06:28

## 2018-04-26 RX ADMIN — GABAPENTIN 300 MILLIGRAM(S): 400 CAPSULE ORAL at 23:19

## 2018-04-26 RX ADMIN — Medication 650 MILLIGRAM(S): at 06:28

## 2018-04-26 RX ADMIN — Medication 650 MILLIGRAM(S): at 19:08

## 2018-04-26 RX ADMIN — Medication 650 MILLIGRAM(S): at 00:20

## 2018-04-26 RX ADMIN — GABAPENTIN 300 MILLIGRAM(S): 400 CAPSULE ORAL at 06:28

## 2018-04-26 RX ADMIN — Medication 400 MILLIGRAM(S): at 06:58

## 2018-04-26 RX ADMIN — Medication 400 MILLIGRAM(S): at 00:50

## 2018-04-26 RX ADMIN — Medication 20 MILLIEQUIVALENT(S): at 19:38

## 2018-04-26 RX ADMIN — Medication 650 MILLIGRAM(S): at 06:58

## 2018-04-26 RX ADMIN — Medication 400 MILLIGRAM(S): at 23:19

## 2018-04-27 LAB
APPEARANCE UR: CLEAR — SIGNIFICANT CHANGE UP
BACTERIA # UR AUTO: NEGATIVE — SIGNIFICANT CHANGE UP
BILIRUB UR-MCNC: NEGATIVE — SIGNIFICANT CHANGE UP
COLOR SPEC: YELLOW — SIGNIFICANT CHANGE UP
DIFF PNL FLD: ABNORMAL
EPI CELLS # UR: SIGNIFICANT CHANGE UP
GLUCOSE UR QL: NEGATIVE MG/DL — SIGNIFICANT CHANGE UP
KETONES UR-MCNC: NEGATIVE — SIGNIFICANT CHANGE UP
LEUKOCYTE ESTERASE UR-ACNC: NEGATIVE — SIGNIFICANT CHANGE UP
NITRITE UR-MCNC: NEGATIVE — SIGNIFICANT CHANGE UP
PH UR: 6 — SIGNIFICANT CHANGE UP (ref 5–8)
PROT UR-MCNC: NEGATIVE MG/DL — SIGNIFICANT CHANGE UP
RBC CASTS # UR COMP ASSIST: ABNORMAL /HPF (ref 0–4)
SP GR SPEC: 1.01 — SIGNIFICANT CHANGE UP (ref 1.01–1.02)
UROBILINOGEN FLD QL: NEGATIVE MG/DL — SIGNIFICANT CHANGE UP
WBC UR QL: SIGNIFICANT CHANGE UP

## 2018-04-27 RX ORDER — SACCHAROMYCES BOULARDII 250 MG
250 POWDER IN PACKET (EA) ORAL
Qty: 0 | Refills: 0 | Status: DISCONTINUED | OUTPATIENT
Start: 2018-04-27 | End: 2018-04-30

## 2018-04-27 RX ADMIN — Medication 650 MILLIGRAM(S): at 06:25

## 2018-04-27 RX ADMIN — Medication 100 MILLIGRAM(S): at 05:32

## 2018-04-27 RX ADMIN — Medication 650 MILLIGRAM(S): at 11:28

## 2018-04-27 RX ADMIN — GABAPENTIN 300 MILLIGRAM(S): 400 CAPSULE ORAL at 13:07

## 2018-04-27 RX ADMIN — TRAMADOL HYDROCHLORIDE 25 MILLIGRAM(S): 50 TABLET ORAL at 08:27

## 2018-04-27 RX ADMIN — PIPERACILLIN AND TAZOBACTAM 25 GRAM(S): 4; .5 INJECTION, POWDER, LYOPHILIZED, FOR SOLUTION INTRAVENOUS at 01:37

## 2018-04-27 RX ADMIN — PIPERACILLIN AND TAZOBACTAM 25 GRAM(S): 4; .5 INJECTION, POWDER, LYOPHILIZED, FOR SOLUTION INTRAVENOUS at 08:26

## 2018-04-27 RX ADMIN — Medication 650 MILLIGRAM(S): at 00:00

## 2018-04-27 RX ADMIN — Medication 400 MILLIGRAM(S): at 00:00

## 2018-04-27 RX ADMIN — Medication 650 MILLIGRAM(S): at 18:40

## 2018-04-27 RX ADMIN — TRAMADOL HYDROCHLORIDE 25 MILLIGRAM(S): 50 TABLET ORAL at 09:20

## 2018-04-27 RX ADMIN — Medication 400 MILLIGRAM(S): at 11:29

## 2018-04-27 RX ADMIN — GABAPENTIN 300 MILLIGRAM(S): 400 CAPSULE ORAL at 05:32

## 2018-04-27 RX ADMIN — Medication 650 MILLIGRAM(S): at 12:20

## 2018-04-27 RX ADMIN — Medication 400 MILLIGRAM(S): at 12:20

## 2018-04-27 RX ADMIN — PIPERACILLIN AND TAZOBACTAM 25 GRAM(S): 4; .5 INJECTION, POWDER, LYOPHILIZED, FOR SOLUTION INTRAVENOUS at 18:11

## 2018-04-27 RX ADMIN — Medication 400 MILLIGRAM(S): at 05:32

## 2018-04-27 RX ADMIN — Medication 650 MILLIGRAM(S): at 05:32

## 2018-04-27 RX ADMIN — Medication 650 MILLIGRAM(S): at 18:10

## 2018-04-27 RX ADMIN — ENOXAPARIN SODIUM 40 MILLIGRAM(S): 100 INJECTION SUBCUTANEOUS at 11:28

## 2018-04-27 RX ADMIN — Medication 400 MILLIGRAM(S): at 18:09

## 2018-04-27 RX ADMIN — Medication 400 MILLIGRAM(S): at 06:25

## 2018-04-27 RX ADMIN — Medication 250 MILLIGRAM(S): at 18:10

## 2018-04-27 RX ADMIN — GABAPENTIN 300 MILLIGRAM(S): 400 CAPSULE ORAL at 21:50

## 2018-04-27 RX ADMIN — Medication 400 MILLIGRAM(S): at 18:39

## 2018-04-27 NOTE — PROGRESS NOTE ADULT - ATTENDING COMMENTS
Doing well.  No N/V.  +flatus +BM.  Afebrile.  HD normal.  BCx w/ GNR.  NAD.  Abdomen: softly distended but NT, incisions clean.  s/p lap appy.  On Zosyn.  Has GNR bacteremia and await speciation w/ sensitivities.  Blood cultures drawn on 4/23 prelim negative.  Will need 2 weeks of abx from date of first negative blood culture.  Ambulate.  Ok to continue diet but if becomes more distended or develops N/V will be made NPO.

## 2018-04-28 LAB
ANION GAP SERPL CALC-SCNC: 15 MMOL/L — SIGNIFICANT CHANGE UP (ref 5–17)
BASOPHILS # BLD AUTO: 0 K/UL — SIGNIFICANT CHANGE UP (ref 0–0.2)
BASOPHILS NFR BLD AUTO: 0.3 % — SIGNIFICANT CHANGE UP (ref 0–2)
BUN SERPL-MCNC: 12 MG/DL — SIGNIFICANT CHANGE UP (ref 8–20)
CALCIUM SERPL-MCNC: 8.7 MG/DL — SIGNIFICANT CHANGE UP (ref 8.6–10.2)
CHLORIDE SERPL-SCNC: 103 MMOL/L — SIGNIFICANT CHANGE UP (ref 98–107)
CO2 SERPL-SCNC: 22 MMOL/L — SIGNIFICANT CHANGE UP (ref 22–29)
CREAT SERPL-MCNC: 0.65 MG/DL — SIGNIFICANT CHANGE UP (ref 0.5–1.3)
CULTURE RESULTS: SIGNIFICANT CHANGE UP
CULTURE RESULTS: SIGNIFICANT CHANGE UP
EOSINOPHIL # BLD AUTO: 0.2 K/UL — SIGNIFICANT CHANGE UP (ref 0–0.5)
EOSINOPHIL NFR BLD AUTO: 3 % — SIGNIFICANT CHANGE UP (ref 0–6)
GLUCOSE SERPL-MCNC: 100 MG/DL — SIGNIFICANT CHANGE UP (ref 70–115)
HCT VFR BLD CALC: 33.6 % — LOW (ref 37–47)
HGB BLD-MCNC: 10.8 G/DL — LOW (ref 12–16)
LYMPHOCYTES # BLD AUTO: 1.1 K/UL — SIGNIFICANT CHANGE UP (ref 1–4.8)
LYMPHOCYTES # BLD AUTO: 17.3 % — LOW (ref 20–55)
MAGNESIUM SERPL-MCNC: 2.3 MG/DL — SIGNIFICANT CHANGE UP (ref 1.6–2.6)
MCHC RBC-ENTMCNC: 28.3 PG — SIGNIFICANT CHANGE UP (ref 27–31)
MCHC RBC-ENTMCNC: 32.1 G/DL — SIGNIFICANT CHANGE UP (ref 32–36)
MCV RBC AUTO: 88 FL — SIGNIFICANT CHANGE UP (ref 81–99)
METHOD TYPE: SIGNIFICANT CHANGE UP
METHOD TYPE: SIGNIFICANT CHANGE UP
MONOCYTES # BLD AUTO: 0.5 K/UL — SIGNIFICANT CHANGE UP (ref 0–0.8)
MONOCYTES NFR BLD AUTO: 8.1 % — SIGNIFICANT CHANGE UP (ref 3–10)
NEUTROPHILS # BLD AUTO: 4.1 K/UL — SIGNIFICANT CHANGE UP (ref 1.8–8)
NEUTROPHILS NFR BLD AUTO: 63.8 % — SIGNIFICANT CHANGE UP (ref 37–73)
ORGANISM # SPEC MICROSCOPIC CNT: SIGNIFICANT CHANGE UP
PHOSPHATE SERPL-MCNC: 4.5 MG/DL — SIGNIFICANT CHANGE UP (ref 2.4–4.7)
PLATELET # BLD AUTO: 268 K/UL — SIGNIFICANT CHANGE UP (ref 150–400)
POTASSIUM SERPL-MCNC: 4.4 MMOL/L — SIGNIFICANT CHANGE UP (ref 3.5–5.3)
POTASSIUM SERPL-SCNC: 4.4 MMOL/L — SIGNIFICANT CHANGE UP (ref 3.5–5.3)
RBC # BLD: 3.82 M/UL — LOW (ref 4.4–5.2)
RBC # FLD: 13.7 % — SIGNIFICANT CHANGE UP (ref 11–15.6)
SODIUM SERPL-SCNC: 140 MMOL/L — SIGNIFICANT CHANGE UP (ref 135–145)
SPECIMEN SOURCE: SIGNIFICANT CHANGE UP
SPECIMEN SOURCE: SIGNIFICANT CHANGE UP
WBC # BLD: 6.4 K/UL — SIGNIFICANT CHANGE UP (ref 4.8–10.8)
WBC # FLD AUTO: 6.4 K/UL — SIGNIFICANT CHANGE UP (ref 4.8–10.8)

## 2018-04-28 RX ORDER — METRONIDAZOLE 500 MG
500 TABLET ORAL ONCE
Qty: 0 | Refills: 0 | Status: COMPLETED | OUTPATIENT
Start: 2018-04-28 | End: 2018-04-28

## 2018-04-28 RX ORDER — METRONIDAZOLE 500 MG
TABLET ORAL
Qty: 0 | Refills: 0 | Status: DISCONTINUED | OUTPATIENT
Start: 2018-04-28 | End: 2018-04-30

## 2018-04-28 RX ORDER — METRONIDAZOLE 500 MG
500 TABLET ORAL EVERY 8 HOURS
Qty: 0 | Refills: 0 | Status: DISCONTINUED | OUTPATIENT
Start: 2018-04-29 | End: 2018-04-30

## 2018-04-28 RX ADMIN — GABAPENTIN 300 MILLIGRAM(S): 400 CAPSULE ORAL at 05:17

## 2018-04-28 RX ADMIN — Medication 650 MILLIGRAM(S): at 21:54

## 2018-04-28 RX ADMIN — PIPERACILLIN AND TAZOBACTAM 25 GRAM(S): 4; .5 INJECTION, POWDER, LYOPHILIZED, FOR SOLUTION INTRAVENOUS at 12:27

## 2018-04-28 RX ADMIN — Medication 400 MILLIGRAM(S): at 05:53

## 2018-04-28 RX ADMIN — Medication 400 MILLIGRAM(S): at 21:54

## 2018-04-28 RX ADMIN — Medication 400 MILLIGRAM(S): at 00:02

## 2018-04-28 RX ADMIN — Medication 250 MILLIGRAM(S): at 17:59

## 2018-04-28 RX ADMIN — Medication 400 MILLIGRAM(S): at 22:12

## 2018-04-28 RX ADMIN — Medication 400 MILLIGRAM(S): at 00:30

## 2018-04-28 RX ADMIN — Medication 400 MILLIGRAM(S): at 18:50

## 2018-04-28 RX ADMIN — Medication 400 MILLIGRAM(S): at 12:28

## 2018-04-28 RX ADMIN — Medication 400 MILLIGRAM(S): at 05:17

## 2018-04-28 RX ADMIN — Medication 100 MILLIGRAM(S): at 22:37

## 2018-04-28 RX ADMIN — Medication 400 MILLIGRAM(S): at 12:59

## 2018-04-28 RX ADMIN — Medication 650 MILLIGRAM(S): at 18:50

## 2018-04-28 RX ADMIN — Medication 650 MILLIGRAM(S): at 00:00

## 2018-04-28 RX ADMIN — Medication 400 MILLIGRAM(S): at 17:59

## 2018-04-28 RX ADMIN — GABAPENTIN 300 MILLIGRAM(S): 400 CAPSULE ORAL at 15:55

## 2018-04-28 RX ADMIN — PIPERACILLIN AND TAZOBACTAM 25 GRAM(S): 4; .5 INJECTION, POWDER, LYOPHILIZED, FOR SOLUTION INTRAVENOUS at 20:01

## 2018-04-28 RX ADMIN — GABAPENTIN 300 MILLIGRAM(S): 400 CAPSULE ORAL at 21:53

## 2018-04-28 RX ADMIN — Medication 650 MILLIGRAM(S): at 12:58

## 2018-04-28 RX ADMIN — Medication 650 MILLIGRAM(S): at 05:17

## 2018-04-28 RX ADMIN — ENOXAPARIN SODIUM 40 MILLIGRAM(S): 100 INJECTION SUBCUTANEOUS at 12:28

## 2018-04-28 RX ADMIN — Medication 100 MILLIGRAM(S): at 21:53

## 2018-04-28 RX ADMIN — Medication 650 MILLIGRAM(S): at 12:29

## 2018-04-28 RX ADMIN — PIPERACILLIN AND TAZOBACTAM 25 GRAM(S): 4; .5 INJECTION, POWDER, LYOPHILIZED, FOR SOLUTION INTRAVENOUS at 00:02

## 2018-04-28 RX ADMIN — Medication 650 MILLIGRAM(S): at 17:57

## 2018-04-28 RX ADMIN — Medication 650 MILLIGRAM(S): at 00:02

## 2018-04-28 RX ADMIN — Medication 650 MILLIGRAM(S): at 22:12

## 2018-04-28 RX ADMIN — Medication 650 MILLIGRAM(S): at 05:53

## 2018-04-28 RX ADMIN — Medication 250 MILLIGRAM(S): at 05:17

## 2018-04-28 NOTE — DIETITIAN INITIAL EVALUATION ADULT. - PERTINENT LABORATORY DATA
04-28 Na140 mmol/L Glu 100 mg/dL K+ 4.4 mmol/L Cr  0.65 mg/dL BUN 12.0 mg/dL Phos 4.5 mg/dL Alb n/a   PAB n/a

## 2018-04-28 NOTE — DIETITIAN INITIAL EVALUATION ADULT. - OTHER INFO
Spoke to pt via . Pt [place], can read questions and answers appropriate. Pt denies wt loss, is edentulous, however has no trouble chewing food (regular consistency). Pt eating 100% at meals, reports can not drink milk, hurts her stomach. + abd distention noted. loose BM noted

## 2018-04-29 RX ORDER — METRONIDAZOLE 500 MG
1 TABLET ORAL
Qty: 34 | Refills: 0 | OUTPATIENT
Start: 2018-04-29 | End: 2018-05-09

## 2018-04-29 RX ADMIN — Medication 100 MILLIGRAM(S): at 21:48

## 2018-04-29 RX ADMIN — ENOXAPARIN SODIUM 40 MILLIGRAM(S): 100 INJECTION SUBCUTANEOUS at 13:21

## 2018-04-29 RX ADMIN — Medication 650 MILLIGRAM(S): at 13:51

## 2018-04-29 RX ADMIN — Medication 650 MILLIGRAM(S): at 17:47

## 2018-04-29 RX ADMIN — Medication 650 MILLIGRAM(S): at 06:02

## 2018-04-29 RX ADMIN — Medication 650 MILLIGRAM(S): at 18:00

## 2018-04-29 RX ADMIN — Medication 400 MILLIGRAM(S): at 06:02

## 2018-04-29 RX ADMIN — Medication 400 MILLIGRAM(S): at 17:47

## 2018-04-29 RX ADMIN — Medication 400 MILLIGRAM(S): at 18:00

## 2018-04-29 RX ADMIN — Medication 250 MILLIGRAM(S): at 06:02

## 2018-04-29 RX ADMIN — Medication 100 MILLIGRAM(S): at 06:01

## 2018-04-29 RX ADMIN — Medication 400 MILLIGRAM(S): at 13:51

## 2018-04-29 RX ADMIN — Medication 400 MILLIGRAM(S): at 13:21

## 2018-04-29 RX ADMIN — Medication 250 MILLIGRAM(S): at 17:47

## 2018-04-29 RX ADMIN — GABAPENTIN 300 MILLIGRAM(S): 400 CAPSULE ORAL at 21:48

## 2018-04-29 RX ADMIN — Medication 100 MILLIGRAM(S): at 13:22

## 2018-04-29 RX ADMIN — Medication 650 MILLIGRAM(S): at 06:32

## 2018-04-29 RX ADMIN — GABAPENTIN 300 MILLIGRAM(S): 400 CAPSULE ORAL at 13:21

## 2018-04-29 RX ADMIN — Medication 650 MILLIGRAM(S): at 13:21

## 2018-04-29 RX ADMIN — GABAPENTIN 300 MILLIGRAM(S): 400 CAPSULE ORAL at 06:02

## 2018-04-29 RX ADMIN — Medication 400 MILLIGRAM(S): at 06:32

## 2018-04-29 NOTE — DISCHARGE NOTE ADULT - VISION (WITH CORRECTIVE LENSES IF THE PATIENT USUALLY WEARS THEM):
Severely impaired: cannot locate objects without hearing or touching them or patient nonresponsive./ptx requires glasses, however glasses were not present  during this admission

## 2018-04-29 NOTE — DISCHARGE NOTE ADULT - PATIENT PORTAL LINK FT
You can access the [email or web address] Patient Portal, offered by [facility], by registering with the following website: [email or web address]

## 2018-04-29 NOTE — DISCHARGE NOTE ADULT - ABILITY TO HEAR (WITH HEARING AID OR HEARING APPLIANCE IF NORMALLY USED):
Severely Impaired: absence of useful hearing/ptx is [facility] requires hearing aides that are not present at this time of hospitalization

## 2018-04-29 NOTE — DISCHARGE NOTE ADULT - CARE PROVIDER_API CALL
Huy Myers (MD), Surgery  [address]  [address]  [place] [address]  Phone: [phone]  Fax: [phone]

## 2018-04-29 NOTE — DISCHARGE NOTE ADULT - MEDICATION SUMMARY - MEDICATIONS TO TAKE
I will START or STAY ON the medications listed below when I get home from the hospital:    metroNIDAZOLE 500 mg oral tablet  -- 1 tab(s) by mouth every 8 hours   -- Do not drink alcoholic beverages when taking this medication.  Finish all this medication unless otherwise directed by prescriber.  May discolor urine or feces.    -- Indication: For Antibiotic

## 2018-04-29 NOTE — DISCHARGE NOTE ADULT - HOSPITAL COURSE
HPI:  Patient is a 72 year old female who presents to the ED with 2 day history of abdominal pain, 8/10 intensity, sharp, epigastric region, does not radiate, no alleviating factors, worse with movement, reports tactile fever at home, denies nausea or emesis, denies diarrhea or constipation, no sick contacts, no recent travel. Pt has no other complaint at this time. (22 Apr 2018 12:23)    Pt was taken for a laparoscopic appendectomy and found to have purulent appendicitis.  Pt was transferred to the floor and was started on a diet.  Pt was complaining of some pain in the post operative period but it has since then been well managed.  Pt had some complaints of diarrhea and a C. Diff was sent but came back negative.  Pt was found to have bacteroidies bacteremia from admission and started on antibiotics.  Pt had continued to improve post operatively and ambulating.  Pt is currently stable for discharge.  Her antibiotics have been switched to oral and sent to her pharmacy.  She will complete a 14 day course of antibiotics from 4/26.

## 2018-04-29 NOTE — DISCHARGE NOTE ADULT - PLAN OF CARE
Alleviation of pain and symptoms Follow up: Please call and make an appointment with Dr. Myers 10-14 days after discharge. Also, please call and make an appointment with your primary care physician as per your usual schedule.   Activity: Please, limit activity and rest until follow up appointment.   Diet: May continue regular diet.  Medications: Please take all home medications as prescribed by your primary care doctor. You are encouraged to take over-the-counter tylenol and/or ibuprofen for pain relief.  Wound Care: Please, keep wound site clean and dry. You may shower, but do not bathe.   If confusion, altered mental status, fever, chest pain, shortness of breath, new or worsening abdominal pain, vomiting, change or worsening of medical status, please come back to the emergency room, and in case of emergency call 911.

## 2018-04-29 NOTE — PROGRESS NOTE ADULT - ATTENDING COMMENTS
Patient seen and examined.  abd benign  complaining of loose stools.  Will discuss with primary Surgery team , possibility of d/c on oral antibiotics to complete her course,.

## 2018-04-29 NOTE — DISCHARGE NOTE ADULT - CARE PLAN
Principal Discharge DX:	Acute appendicitis, unspecified acute appendicitis type  Goal:	Alleviation of pain and symptoms  Assessment and plan of treatment:	Follow up: Please call and make an appointment with Dr. Myers 10-14 days after discharge. Also, please call and make an appointment with your primary care physician as per your usual schedule.   Activity: Please, limit activity and rest until follow up appointment.   Diet: May continue regular diet.  Medications: Please take all home medications as prescribed by your primary care doctor. You are encouraged to take over-the-counter tylenol and/or ibuprofen for pain relief.  Wound Care: Please, keep wound site clean and dry. You may shower, but do not bathe.   If confusion, altered mental status, fever, chest pain, shortness of breath, new or worsening abdominal pain, vomiting, change or worsening of medical status, please come back to the emergency room, and in case of emergency call 911.

## 2018-04-30 VITALS
TEMPERATURE: 98 F | HEART RATE: 79 BPM | OXYGEN SATURATION: 98 % | RESPIRATION RATE: 18 BRPM | DIASTOLIC BLOOD PRESSURE: 70 MMHG | SYSTOLIC BLOOD PRESSURE: 126 MMHG

## 2018-04-30 LAB
ANION GAP SERPL CALC-SCNC: 14 MMOL/L — SIGNIFICANT CHANGE UP (ref 5–17)
BASOPHILS # BLD AUTO: 0 K/UL — SIGNIFICANT CHANGE UP (ref 0–0.2)
BASOPHILS NFR BLD AUTO: 0.1 % — SIGNIFICANT CHANGE UP (ref 0–2)
BUN SERPL-MCNC: 20 MG/DL — SIGNIFICANT CHANGE UP (ref 8–20)
CALCIUM SERPL-MCNC: 8.9 MG/DL — SIGNIFICANT CHANGE UP (ref 8.6–10.2)
CHLORIDE SERPL-SCNC: 100 MMOL/L — SIGNIFICANT CHANGE UP (ref 98–107)
CO2 SERPL-SCNC: 20 MMOL/L — LOW (ref 22–29)
CREAT SERPL-MCNC: 0.6 MG/DL — SIGNIFICANT CHANGE UP (ref 0.5–1.3)
EOSINOPHIL # BLD AUTO: 0.1 K/UL — SIGNIFICANT CHANGE UP (ref 0–0.5)
EOSINOPHIL NFR BLD AUTO: 1.9 % — SIGNIFICANT CHANGE UP (ref 0–6)
GLUCOSE SERPL-MCNC: 93 MG/DL — SIGNIFICANT CHANGE UP (ref 70–115)
HCT VFR BLD CALC: 34.7 % — LOW (ref 37–47)
HGB BLD-MCNC: 11.2 G/DL — LOW (ref 12–16)
LYMPHOCYTES # BLD AUTO: 1.2 K/UL — SIGNIFICANT CHANGE UP (ref 1–4.8)
LYMPHOCYTES # BLD AUTO: 16.8 % — LOW (ref 20–55)
MAGNESIUM SERPL-MCNC: 2.3 MG/DL — SIGNIFICANT CHANGE UP (ref 1.6–2.6)
MCHC RBC-ENTMCNC: 28.4 PG — SIGNIFICANT CHANGE UP (ref 27–31)
MCHC RBC-ENTMCNC: 32.3 G/DL — SIGNIFICANT CHANGE UP (ref 32–36)
MCV RBC AUTO: 87.8 FL — SIGNIFICANT CHANGE UP (ref 81–99)
MONOCYTES # BLD AUTO: 0.7 K/UL — SIGNIFICANT CHANGE UP (ref 0–0.8)
MONOCYTES NFR BLD AUTO: 9.7 % — SIGNIFICANT CHANGE UP (ref 3–10)
NEUTROPHILS # BLD AUTO: 4.7 K/UL — SIGNIFICANT CHANGE UP (ref 1.8–8)
NEUTROPHILS NFR BLD AUTO: 67.6 % — SIGNIFICANT CHANGE UP (ref 37–73)
PHOSPHATE SERPL-MCNC: 4.3 MG/DL — SIGNIFICANT CHANGE UP (ref 2.4–4.7)
PLATELET # BLD AUTO: 325 K/UL — SIGNIFICANT CHANGE UP (ref 150–400)
POTASSIUM SERPL-MCNC: 4.3 MMOL/L — SIGNIFICANT CHANGE UP (ref 3.5–5.3)
POTASSIUM SERPL-SCNC: 4.3 MMOL/L — SIGNIFICANT CHANGE UP (ref 3.5–5.3)
RBC # BLD: 3.95 M/UL — LOW (ref 4.4–5.2)
RBC # FLD: 13.2 % — SIGNIFICANT CHANGE UP (ref 11–15.6)
SODIUM SERPL-SCNC: 134 MMOL/L — LOW (ref 135–145)
WBC # BLD: 6.9 K/UL — SIGNIFICANT CHANGE UP (ref 4.8–10.8)
WBC # FLD AUTO: 6.9 K/UL — SIGNIFICANT CHANGE UP (ref 4.8–10.8)

## 2018-04-30 RX ADMIN — Medication 100 MILLIGRAM(S): at 14:52

## 2018-04-30 RX ADMIN — Medication 100 MILLIGRAM(S): at 05:25

## 2018-04-30 RX ADMIN — Medication 400 MILLIGRAM(S): at 12:01

## 2018-04-30 RX ADMIN — Medication 400 MILLIGRAM(S): at 01:18

## 2018-04-30 RX ADMIN — Medication 400 MILLIGRAM(S): at 05:25

## 2018-04-30 RX ADMIN — Medication 650 MILLIGRAM(S): at 05:25

## 2018-04-30 RX ADMIN — Medication 650 MILLIGRAM(S): at 05:55

## 2018-04-30 RX ADMIN — Medication 650 MILLIGRAM(S): at 01:18

## 2018-04-30 RX ADMIN — Medication 400 MILLIGRAM(S): at 12:57

## 2018-04-30 RX ADMIN — Medication 400 MILLIGRAM(S): at 05:55

## 2018-04-30 RX ADMIN — Medication 650 MILLIGRAM(S): at 12:01

## 2018-04-30 RX ADMIN — Medication 650 MILLIGRAM(S): at 00:48

## 2018-04-30 RX ADMIN — Medication 400 MILLIGRAM(S): at 00:51

## 2018-04-30 RX ADMIN — ENOXAPARIN SODIUM 40 MILLIGRAM(S): 100 INJECTION SUBCUTANEOUS at 12:06

## 2018-04-30 RX ADMIN — Medication 250 MILLIGRAM(S): at 05:24

## 2018-04-30 RX ADMIN — GABAPENTIN 300 MILLIGRAM(S): 400 CAPSULE ORAL at 05:24

## 2018-04-30 RX ADMIN — Medication 100 MILLIGRAM(S): at 05:26

## 2018-04-30 RX ADMIN — Medication 650 MILLIGRAM(S): at 12:57

## 2018-04-30 NOTE — PROGRESS NOTE ADULT - ASSESSMENT
71 yo F s/p lap appy for acute perforated appendicitis with resolving ileus. Clinically stable for discharge at this time.  -C.diff negative  -Continue regular diet  -PRN pain control  -DVT ppx  -strict I/Os  -OOB, ambulate, IS use  Dispo: D/c Today with outpatient follow up
71 yo F s/p lap appy for acute apendicitis; recovering well  -f/up urine output  -AM labs  -pain control prn  -dvt ppx  -monitor diet tolerance at breakfast  -monitor for bowel movements and flatus
71 yo F s/p lap appy for acute perforated appendicitis with resolving ileus. Clinically stable for discharge at this time.  -C.diff negative  -Continue regular diet  -PRN pain control  -DVT ppx  -strict I/Os  -OOB, ambulate, IS use  Dispo: D/c Today with outpatient follow up
73 yo F s/p lap appy for acute perforated appendicitis with resolving ileus  -C.diff negative  -Continue regular diet  -PRN pain control  -DVT ppx  -strict I/Os  -OOB, ambulate, IS use  -PT  Dispo: continue inpatient care.
73 yo F s/p lap appy for acute perforated appendicitis with resolving ileus  -C.diff negative  -Continue regular diet  -PRN pain control  -DVT ppx  -strict I/Os  -OOB, ambulate, IS use  -PT  Dispo: continue inpatient care.
73 yo F s/p lap appy for acute perforated appendicitis with resolving ileus  -UA for lower abdominal pain  -C.diff 2/2 new onset diarrhea  -Continue regular diet  -PRN pain control  -DVT ppx  -strict I/Os  -OOB, ambulate, IS use  Dispo: continue inpatient care.
73 yo F s/p lap appy for acute perforated appendicitis with resolving ileus  -advance to regular diet  -pain control  -DVT ppx  -strict I/Os  -encourage OOB  -incentive spirometer

## 2018-04-30 NOTE — PROGRESS NOTE ADULT - SUBJECTIVE AND OBJECTIVE BOX
INTERVAL HPI/OVERNIGHT EVENTS:    SUBJECTIVE:  No events overnight, no complaints. Tolerating diet, no abdominal pain. No N/V.     MEDICATIONS  (STANDING):  acetaminophen   Tablet. 650 milliGRAM(s) Oral every 6 hours  docusate sodium 100 milliGRAM(s) Oral three times a day  enoxaparin Injectable 40 milliGRAM(s) SubCutaneous daily  gabapentin 300 milliGRAM(s) Oral three times a day  ibuprofen  Tablet 400 milliGRAM(s) Oral every 6 hours  metroNIDAZOLE  IVPB      metroNIDAZOLE  IVPB 500 milliGRAM(s) IV Intermittent every 8 hours  saccharomyces boulardii 250 milliGRAM(s) Oral two times a day    MEDICATIONS  (PRN):  ALBUTerol/ipratropium for Nebulization 3 milliLiter(s) Nebulizer every 6 hours PRN Shortness of Breath and/or Wheezing  ondansetron Injectable 4 milliGRAM(s) IV Push every 6 hours PRN Nausea  senna 2 Tablet(s) Oral at bedtime PRN Constipation      Vital Signs Last 24 Hrs  T(C): 36.7 (30 Apr 2018 08:31), Max: 36.9 (29 Apr 2018 16:39)  T(F): 98 (30 Apr 2018 08:31), Max: 98.4 (29 Apr 2018 16:39)  HR: 77 (30 Apr 2018 08:31) (75 - 90)  BP: 124/71 (30 Apr 2018 08:31) (124/71 - 138/76)  BP(mean): --  RR: 18 (30 Apr 2018 08:31) (16 - 20)  SpO2: 98% (30 Apr 2018 08:31) (96% - 99%)    PE  Gen: AAO x3, NAD  Pulm: CTAB, Symmetrical chest rise  CV: RRR  Abd: Soft, mild RLQ tenderness, otherwise NT, ND, -R/-G  Ext: No C/C/E  Vasc: 2+ Radial, DP pulses  Neuro: No focal neurological deficits      I&O's Detail      LABS:                        11.2   6.9   )-----------( 325      ( 30 Apr 2018 07:49 )             34.7     04-30    134<L>  |  100  |  20.0  ----------------------------<  93  4.3   |  20.0<L>  |  0.60    Ca    8.9      30 Apr 2018 07:48  Phos  4.3     04-30  Mg     2.3     04-30            RADIOLOGY & ADDITIONAL STUDIES:
Acute Care Surgery /Trauma PROGRESS NOTE:     SUBJECTIVE: Pt seen and examined at bedside. No acute overnight events. Pain well controlled. Tolerating diet, no n/v. Voiding well. Passing flatus, normal BM. Denies f/c/sob/cp. Ambulatory OOB, using incentive spirometer often    Vital Signs Last 24 Hrs  T(C): 36.4 (2018 08:12), Max: 36.6 (2018 16:28)  T(F): 97.5 (2018 08:12), Max: 97.9 (2018 16:28)  HR: 85 (2018 08:12) (77 - 85)  BP: 140/82 (2018 08:12) (140/82 - 158/76)  BP(mean): --  RR: 19 (2018 08:12) (18 - 19)  SpO2: 97% (2018 22:51) (97% - 97%)    OBJECTIVE:  NAD, hard of hearing  NC/AT  RRR  No respiratory distress  Abd soft, nondistended, mild ttp RLQ, no guarding or rebound. No peritoneal signs.  incisions c/d/i  No pedal edema    I&O's Detail    2018 07:01  -  2018 07:00  --------------------------------------------------------  IN:    lactated ringers.: 1125 mL    Oral Fluid: 650 mL    Solution: 50 mL  Total IN: 1825 mL    OUT:    Voided: 1200 mL  Total OUT: 1200 mL    Total NET: 625 mL          MEDICATIONS  (STANDING):  acetaminophen   Tablet. 650 milliGRAM(s) Oral every 6 hours  docusate sodium 100 milliGRAM(s) Oral three times a day  enoxaparin Injectable 40 milliGRAM(s) SubCutaneous daily  gabapentin 300 milliGRAM(s) Oral three times a day  ibuprofen  Tablet 400 milliGRAM(s) Oral every 6 hours  lactated ringers. 1000 milliLiter(s) (75 mL/Hr) IV Continuous <Continuous>  piperacillin/tazobactam IVPB. 3.375 Gram(s) IV Intermittent every 8 hours    MEDICATIONS  (PRN):  ALBUTerol/ipratropium for Nebulization 3 milliLiter(s) Nebulizer every 6 hours PRN Shortness of Breath and/or Wheezing  ondansetron Injectable 4 milliGRAM(s) IV Push every 6 hours PRN Nausea  senna 2 Tablet(s) Oral at bedtime PRN Constipation  traMADol 25 milliGRAM(s) Oral every 4 hours PRN Mild Pain (1 - 3)      LABS:                        9.8    8.7   )-----------( 179      ( 2018 08:02 )             30.1     04-24    140  |  105  |  14.0  ----------------------------<  86  3.4<L>   |  23.0  |  0.73    Ca    8.0<L>      2018 08:02  Phos  2.2     04-24  Mg     2.2     04-24        Urinalysis Basic - ( 2018 14:04 )    Color: Yellow / Appearance: Clear / S.005 / pH: x  Gluc: x / Ketone: Negative  / Bili: Negative / Urobili: Negative mg/dL   Blood: x / Protein: Negative mg/dL / Nitrite: Negative   Leuk Esterase: Negative / RBC: 3-5 /HPF / WBC 0-2   Sq Epi: x / Non Sq Epi: Occasional / Bacteria: x          RADIOLOGY & ADDITIONAL STUDIES:
Complains of some pain  afebrile  abd soft nontender  inc c/d  wbc 8  Improved   supoortive care  cont Abx
Feels ok c/o some flank pain  having diarrhea  afebrile  abd slight distention soft nontender  lungs clear   stool c dif  supportive care   Cont abx  BC +
Feels ok had diarhea yesterday  afebrile  lungs exp wheeze  abd slight distention nontender  Inc c/d  supportive care
INTERVAL HPI/OVERNIGHT EVENTS: No acute events overnight    SUBJECTIVE: Continued mild right flank pain this AM. Afebrile. Tolerating diet. Ambulating without assistance. +Urination. + Flatus. +Diarrhea, but improving. Denies F/C/N/V/CP/SOB.       MEDICATIONS  (STANDING):  acetaminophen   Tablet. 650 milliGRAM(s) Oral every 6 hours  docusate sodium 100 milliGRAM(s) Oral three times a day  enoxaparin Injectable 40 milliGRAM(s) SubCutaneous daily  gabapentin 300 milliGRAM(s) Oral three times a day  ibuprofen  Tablet 400 milliGRAM(s) Oral every 6 hours  piperacillin/tazobactam IVPB. 3.375 Gram(s) IV Intermittent every 8 hours  saccharomyces boulardii 250 milliGRAM(s) Oral two times a day    MEDICATIONS  (PRN):  ALBUTerol/ipratropium for Nebulization 3 milliLiter(s) Nebulizer every 6 hours PRN Shortness of Breath and/or Wheezing  ondansetron Injectable 4 milliGRAM(s) IV Push every 6 hours PRN Nausea  senna 2 Tablet(s) Oral at bedtime PRN Constipation  traMADol 25 milliGRAM(s) Oral every 4 hours PRN Mild Pain (1 - 3)      Vital Signs Last 24 Hrs  T(C): 36.7 (2018 08:08), Max: 37.7 (2018 20:30)  T(F): 98.1 (2018 08:08), Max: 99.8 (2018 20:30)  HR: 80 (2018 08:08) (80 - 87)  BP: 117/64 (2018 08:08) (117/64 - 138/81)  BP(mean): --  RR: 18 (2018 08:08) (18 - 18)  SpO2: 95% (2018 08:08) (95% - 98%)    PE  Gen: AAO x3, NAD  Pulm: CTAB, Symmetrical chest rise  CV: RRR  Abd: Soft, Mild RLQ TTP, ND, -R/-G  Ext: No C/C/E  Vasc: 2+ Radial, DP pulses  Neuro: No focal neurological deficits      I&O's Detail    2018 07:01  -  2018 07:00  --------------------------------------------------------  IN:    Solution: 150 mL  Total IN: 150 mL    OUT:  Total OUT: 0 mL    Total NET: 150 mL          LABS:                        11.0   6.1   )-----------( 226      ( 2018 10:12 )             34.2     -28    140  |  103  |  12.0  ----------------------------<  100  4.4   |  22.0  |  0.65    Ca    8.7      2018 08:19  Phos  4.5       Mg     2.3             Urinalysis Basic - ( 2018 21:46 )    Color: Yellow / Appearance: Clear / S.015 / pH: x  Gluc: x / Ketone: Negative  / Bili: Negative / Urobili: Negative mg/dL   Blood: x / Protein: Negative mg/dL / Nitrite: Negative   Leuk Esterase: Negative / RBC: 3-5 /HPF / WBC 0-2   Sq Epi: x / Non Sq Epi: Few / Bacteria: Negative
INTERVAL HPI/OVERNIGHT EVENTS: No acute events overnight    SUBJECTIVE: Continued right flank pain, improving. Afebrile. Tolerating diet. Ambulating without assistance. +Urination. + Flatus. +BM, subjective diarrhea. Denies F/C/N/V/CP/SOB.       MEDICATIONS  (STANDING):  acetaminophen   Tablet. 650 milliGRAM(s) Oral every 6 hours  docusate sodium 100 milliGRAM(s) Oral three times a day  enoxaparin Injectable 40 milliGRAM(s) SubCutaneous daily  gabapentin 300 milliGRAM(s) Oral three times a day  ibuprofen  Tablet 400 milliGRAM(s) Oral every 6 hours  metroNIDAZOLE  IVPB      metroNIDAZOLE  IVPB 500 milliGRAM(s) IV Intermittent every 8 hours  saccharomyces boulardii 250 milliGRAM(s) Oral two times a day    MEDICATIONS  (PRN):  ALBUTerol/ipratropium for Nebulization 3 milliLiter(s) Nebulizer every 6 hours PRN Shortness of Breath and/or Wheezing  ondansetron Injectable 4 milliGRAM(s) IV Push every 6 hours PRN Nausea  senna 2 Tablet(s) Oral at bedtime PRN Constipation  traMADol 25 milliGRAM(s) Oral every 4 hours PRN Mild Pain (1 - 3)      Vital Signs Last 24 Hrs  T(C): 36.8 (2018 08:26), Max: 36.8 (2018 21:47)  T(F): 98.2 (2018 08:26), Max: 98.3 (2018 21:47)  HR: 76 (2018 08:26) (71 - 80)  BP: 111/59 (2018 08:26) (111/59 - 145/84)  BP(mean): --  RR: 18 (2018 08:26) (16 - 18)  SpO2: 98% (2018 08:26) (91% - 100%)    PE  Gen: AAO x3, NAD  Pulm: CTAB, Symmetrical chest rise  CV: RRR  Abd: Soft, mild RLQ tenderness, otherwise NT, ND, -R/-G  Ext: No C/C/E  Vasc: 2+ Radial, DP pulses  Neuro: No focal neurological deficits      I&O's Detail      LABS:                        10.8   6.4   )-----------( 268      ( 2018 08:54 )             33.6         140  |  103  |  12.0  ----------------------------<  100  4.4   |  22.0  |  0.65    Ca    8.7      2018 08:19  Phos  4.5       Mg     2.3             Urinalysis Basic - ( 2018 21:46 )    Color: Yellow / Appearance: Clear / S.015 / pH: x  Gluc: x / Ketone: Negative  / Bili: Negative / Urobili: Negative mg/dL   Blood: x / Protein: Negative mg/dL / Nitrite: Negative   Leuk Esterase: Negative / RBC: 3-5 /HPF / WBC 0-2   Sq Epi: x / Non Sq Epi: Few / Bacteria: Negative
INTERVAL HPI/OVERNIGHT EVENTS: No acute events overnight    SUBJECTIVE: Denies abdominal pain this AM. Afebrile. Abominal distension overnight w/ associated diarrhea. Denies nausea or vomiting. Tolerating regular diet. Minimal ambulation. +Urination. + Flatus. Denies F/C/CP/SOB.       MEDICATIONS  (STANDING):  acetaminophen   Tablet. 650 milliGRAM(s) Oral every 6 hours  docusate sodium 100 milliGRAM(s) Oral three times a day  enoxaparin Injectable 40 milliGRAM(s) SubCutaneous daily  gabapentin 300 milliGRAM(s) Oral three times a day  ibuprofen  Tablet 400 milliGRAM(s) Oral every 6 hours  piperacillin/tazobactam IVPB. 3.375 Gram(s) IV Intermittent every 8 hours    MEDICATIONS  (PRN):  ALBUTerol/ipratropium for Nebulization 3 milliLiter(s) Nebulizer every 6 hours PRN Shortness of Breath and/or Wheezing  ondansetron Injectable 4 milliGRAM(s) IV Push every 6 hours PRN Nausea  senna 2 Tablet(s) Oral at bedtime PRN Constipation  traMADol 25 milliGRAM(s) Oral every 4 hours PRN Mild Pain (1 - 3)      Vital Signs Last 24 Hrs  T(C): 37.1 (27 Apr 2018 07:37), Max: 37.1 (27 Apr 2018 07:37)  T(F): 98.8 (27 Apr 2018 07:37), Max: 98.8 (27 Apr 2018 07:37)  HR: 74 (27 Apr 2018 07:37) (74 - 76)  BP: 128/76 (27 Apr 2018 07:37) (128/76 - 149/76)  BP(mean): --  RR: 18 (27 Apr 2018 07:37) (18 - 18)  SpO2: 98% (27 Apr 2018 07:37) (98% - 99%)    PE  Gen: AAO x3, NAD  Pulm: CTAB, Symmetrical chest rise  CV: RRR  Abd: Soft, appropriately tender to palpation, mild-moderate distension, w/ increased tympany of the abdomen, -R/-G  Ext: No C/C/E  Vasc: 2+ Radial, DP pulses  Neuro: No focal neurological deficits      I&O's Detail    27 Apr 2018 07:01  -  27 Apr 2018 11:06  --------------------------------------------------------  IN:    Solution: 75 mL  Total IN: 75 mL    OUT:  Total OUT: 0 mL    Total NET: 75 mL          LABS:                        11.0   6.1   )-----------( 226 ( 26 Apr 2018 10:12 )             34.2
INTERVAL HPI/OVERNIGHT EVENTS: No acute events overnight    SUBJECTIVE: Lower abdominal and flank pain this AM. Wheezing improved. Blood cultures positive for GNR bacteremia from 4/22. Afebrile.  Tolerating diet. Ambulating without assistance. +Urination. + Flatus. +BM, diarrhea. Denies F/C/N/V/CP/SOB.       MEDICATIONS  (STANDING):  acetaminophen   Tablet. 650 milliGRAM(s) Oral every 6 hours  docusate sodium 100 milliGRAM(s) Oral three times a day  enoxaparin Injectable 40 milliGRAM(s) SubCutaneous daily  gabapentin 300 milliGRAM(s) Oral three times a day  ibuprofen  Tablet 400 milliGRAM(s) Oral every 6 hours  piperacillin/tazobactam IVPB. 3.375 Gram(s) IV Intermittent every 8 hours    MEDICATIONS  (PRN):  ALBUTerol/ipratropium for Nebulization 3 milliLiter(s) Nebulizer every 6 hours PRN Shortness of Breath and/or Wheezing  ondansetron Injectable 4 milliGRAM(s) IV Push every 6 hours PRN Nausea  senna 2 Tablet(s) Oral at bedtime PRN Constipation  traMADol 25 milliGRAM(s) Oral every 4 hours PRN Mild Pain (1 - 3)      Vital Signs Last 24 Hrs  T(C): 36.9 (26 Apr 2018 08:18), Max: 37.5 (25 Apr 2018 15:51)  T(F): 98.4 (26 Apr 2018 08:18), Max: 99.5 (25 Apr 2018 15:51)  HR: 70 (26 Apr 2018 08:18) (70 - 90)  BP: 131/77 (26 Apr 2018 08:18) (120/85 - 136/81)  BP(mean): --  RR: 19 (26 Apr 2018 08:18) (18 - 19)  SpO2: 99% (25 Apr 2018 23:37) (97% - 99%)    PE  Gen: AAO x3, NAD  Pulm: CTAB, Symmetrical chest rise  CV: RRR  Abd: Wound sites c/d/i. TTP to lower abdomen. otherwise soft, NT, ND, -R/-G  Ext: No C/C/E  Vasc: 2+ Radial, DP pulses  Neuro: No focal neurological deficits      I&O's Detail      LABS:
POST OP CHECK    SUBJECTIVE: Pt seen and examined at bedside s/p lap appy. Pt is doing well. Reports feeling positional pain in her abdomen but otherwise patient says the pain is controlled and improving. On physical exam she is tender to palpation at incision sites. Pt is afebrile. Tolerating CLD.  Denies F/C/N/V/CP/SOB.       MEDICATIONS  (STANDING):  acetaminophen   Tablet. 650 milliGRAM(s) Oral every 6 hours  docusate sodium 100 milliGRAM(s) Oral three times a day  enoxaparin Injectable 40 milliGRAM(s) SubCutaneous daily  gabapentin 300 milliGRAM(s) Oral three times a day  ibuprofen  Tablet 400 milliGRAM(s) Oral every 6 hours  piperacillin/tazobactam IVPB. 3.375 Gram(s) IV Intermittent every 8 hours    MEDICATIONS  (PRN):  ondansetron Injectable 4 milliGRAM(s) IV Push every 6 hours PRN Nausea  senna 2 Tablet(s) Oral at bedtime PRN Constipation  traMADol 25 milliGRAM(s) Oral every 4 hours PRN Mild Pain (1 - 3)      Vital Signs Last 24 Hrs  T(F): 97.6 (22 Apr 2018 16:20), Max: 100.4 (22 Apr 2018 10:51)  HR: 74 (22 Apr 2018 22:29) (74 - 140)  BP: 106/51 (22 Apr 2018 16:50) (95/55 - 113/67)  BP(mean): 65 (22 Apr 2018 16:50) (65 - 78)  RR: 18 (22 Apr 2018 22:29) (17 - 21)  SpO2: 97% (22 Apr 2018 22:29) (94% - 97%)    PE  Gen: AAO x3, NAD  Pulm: CTAB, Symmetrical chest rise  CV: RRR  Abd: Incision sites are clean dry and intact, Soft, mild distension and minimal tenderness to palpation, -R/-G  Ext: No C/C/E  Vasc: 2+ Radial, DP pulses  Neuro: No focal neurological deficits      I&O's Detail    22 Apr 2018 07:01  -  23 Apr 2018 01:19  --------------------------------------------------------  IN:    lactated ringers.: 100 mL    Oral Fluid: 100 mL  Total IN: 200 mL    OUT:  Total OUT: 0 mL    Total NET: 200 mL          LABS:                        12.9   14.0  )-----------( 233      ( 22 Apr 2018 09:37 )             38.6     04-22    133<L>  |  92<L>  |  30.0<H>  ----------------------------<  115  3.7   |  23.0  |  1.06    Ca    9.6      22 Apr 2018 09:37    TPro  8.7  /  Alb  4.5  /  TBili  1.1  /  DBili  x   /  AST  27  /  ALT  27  /  AlkPhos  85  04-22    PT/INR - ( 22 Apr 2018 09:37 )   PT: 12.7 sec;   INR: 1.15 ratio         PTT - ( 22 Apr 2018 09:37 )  PTT:28.1 sec

## 2018-05-01 LAB — SURGICAL PATHOLOGY FINAL REPORT - CH: SIGNIFICANT CHANGE UP

## 2018-05-02 LAB
CULTURE RESULTS: SIGNIFICANT CHANGE UP
SPECIMEN SOURCE: SIGNIFICANT CHANGE UP

## 2018-05-03 LAB
CULTURE RESULTS: SIGNIFICANT CHANGE UP
CULTURE RESULTS: SIGNIFICANT CHANGE UP
SPECIMEN SOURCE: SIGNIFICANT CHANGE UP
SPECIMEN SOURCE: SIGNIFICANT CHANGE UP

## 2018-05-23 PROCEDURE — 80053 COMPREHEN METABOLIC PANEL: CPT

## 2018-05-23 PROCEDURE — 86900 BLOOD TYPING SEROLOGIC ABO: CPT

## 2018-05-23 PROCEDURE — 96374 THER/PROPH/DIAG INJ IV PUSH: CPT | Mod: XU

## 2018-05-23 PROCEDURE — 80048 BASIC METABOLIC PNL TOTAL CA: CPT

## 2018-05-23 PROCEDURE — 94640 AIRWAY INHALATION TREATMENT: CPT

## 2018-05-23 PROCEDURE — 83690 ASSAY OF LIPASE: CPT

## 2018-05-23 PROCEDURE — 83605 ASSAY OF LACTIC ACID: CPT

## 2018-05-23 PROCEDURE — 87150 DNA/RNA AMPLIFIED PROBE: CPT

## 2018-05-23 PROCEDURE — 71045 X-RAY EXAM CHEST 1 VIEW: CPT

## 2018-05-23 PROCEDURE — 86901 BLOOD TYPING SEROLOGIC RH(D): CPT

## 2018-05-23 PROCEDURE — 97163 PT EVAL HIGH COMPLEX 45 MIN: CPT

## 2018-05-23 PROCEDURE — 83735 ASSAY OF MAGNESIUM: CPT

## 2018-05-23 PROCEDURE — 81001 URINALYSIS AUTO W/SCOPE: CPT

## 2018-05-23 PROCEDURE — 97116 GAIT TRAINING THERAPY: CPT

## 2018-05-23 PROCEDURE — 87040 BLOOD CULTURE FOR BACTERIA: CPT

## 2018-05-23 PROCEDURE — T1013: CPT

## 2018-05-23 PROCEDURE — 84100 ASSAY OF PHOSPHORUS: CPT

## 2018-05-23 PROCEDURE — 85027 COMPLETE CBC AUTOMATED: CPT

## 2018-05-23 PROCEDURE — 86850 RBC ANTIBODY SCREEN: CPT

## 2018-05-23 PROCEDURE — 96375 TX/PRO/DX INJ NEW DRUG ADDON: CPT

## 2018-05-23 PROCEDURE — 87493 C DIFF AMPLIFIED PROBE: CPT

## 2018-05-23 PROCEDURE — 88304 TISSUE EXAM BY PATHOLOGIST: CPT

## 2018-05-23 PROCEDURE — 74177 CT ABD & PELVIS W/CONTRAST: CPT

## 2018-05-23 PROCEDURE — 85730 THROMBOPLASTIN TIME PARTIAL: CPT

## 2018-05-23 PROCEDURE — 88305 TISSUE EXAM BY PATHOLOGIST: CPT

## 2018-05-23 PROCEDURE — 85610 PROTHROMBIN TIME: CPT

## 2018-05-23 PROCEDURE — 93005 ELECTROCARDIOGRAM TRACING: CPT

## 2018-05-23 PROCEDURE — 99285 EMERGENCY DEPT VISIT HI MDM: CPT | Mod: 25

## 2018-05-23 PROCEDURE — 97530 THERAPEUTIC ACTIVITIES: CPT

## 2018-05-23 PROCEDURE — 36415 COLL VENOUS BLD VENIPUNCTURE: CPT

## 2020-07-27 NOTE — PROGRESS NOTE ADULT - ATTENDING COMMENTS
Referral order placed.   blood cx's +bacteriodes , repeat are neg  avss  abd soft nt nd  plan   change to flagyl  stop zosyn  regular diet  PT / OT eval for placement.

## 2021-01-13 NOTE — ED ADULT NURSE NOTE - NS ED NURSE LEVEL OF CONSCIOUSNESS SPEECH
     
Routine Health maintenance: You need to get a yearly follow up/physical exam to review, discuss age and gender appropriate exams, labs, vaccines and screening tests. This includes cardiovascular health risk, cancer screens and other aldo related topics. Medications-Take all medications as directed. Please do not stop unless you talk to your doctor or health care provider first. Report any problems immediately. Referrals: if you have been given a referral, please call the office if you do not hear from provider in one week. You may make the appointment yourself. Please keep all appointments with specialists and ask them to send their notes, thoughts, recommendations to us , as your PCP. Imaging/Labs:  Be sure to get these images in a timely manner. IF your test must be scheduled, let us know if you need help getting this done and if you do not hear from that provider in a week , call us or them.  BE SURE to call the office if you do not hear regarding the results in one week after the test is performed Image or lab).  It is our intention to inform you of the results ALWAYS, even if normal you should get a notification (Call, portal message). PLEASE jean-paul if you do not get the results.   
 
PLEASE follow all recommendations and call/come in /ask questions if you do not understand of if problems develop after or in between visits.  Failure to comply with recommended health care advise could result in serious health consequences.  
 
Thank you for choosing our practice and please let us know how we can help you feel better and stay well!  
 
 YOU have been given a prescription for narcotic pain medication. It can cause addiction,l overdose, mental impairment, disability and /or death. It can cause constipation, nausea, itching, swelling among other side effects. Please report any concerns to the doctor immediately. IT should not be taken with other prescription pain medications , anxiety or \"nerve pills) -if so you need to make sure you have a prescription for [facility], a reversal agent of narcotic pain medication. You must take as directed and if you are not hurting dont take the medication. IT works better and less likely to cause side effects or adverse health outcomes if you take it sparingly. Consider tapering down the dose if not in process already. For instance, try to take one less per day every day or two. You need to bring the pills to [facility] and  will also be subject to Random urine drug screens and pill counts. DO NOT mix with alcohol. Failure to comply with recommendation could result in bodily harm and/or death. Failure to comply may also result in dismissal from our medical practice and discontinuation of the medicine. At that time you will be asked to taper your medication by one tablet every 3 days. Please do not hesitate to ask any questions, especially if you do not understand the regulations for you and our practice are subject to them. Speaking Coherently

## 2021-04-14 NOTE — H&P ADULT - PROBLEM SELECTOR PROBLEM 1
Pt calling back.   She reports her BG is 380   She did take 12 units of insulin and ate a sandwich for lunch.  Informed pt that msg will be sent to PCP to advise further on.   Pt states she is not going anywhere today as she is seeing her new home tonight.   Confirmed with pt that she has no fever, UTI symptoms, or nausea/vomiting.          Acute appendicitis, unspecified acute appendicitis type

## 2023-02-28 NOTE — ED ADULT TRIAGE NOTE - PRO INTERPRETER NEED 2
[place] Closure 3 Information: This tab is for additional flaps and grafts above and beyond our usual structured repairs.  Please note if you enter information here it will not currently bill and you will need to add the billing information manually.